# Patient Record
Sex: FEMALE | Race: OTHER | Employment: UNEMPLOYED | URBAN - METROPOLITAN AREA
[De-identification: names, ages, dates, MRNs, and addresses within clinical notes are randomized per-mention and may not be internally consistent; named-entity substitution may affect disease eponyms.]

---

## 2017-09-06 ENCOUNTER — HOSPITAL ENCOUNTER (OUTPATIENT)
Dept: OTHER | Age: 32
Discharge: HOME | End: 2017-09-06
Attending: FAMILY MEDICINE | Admitting: FAMILY MEDICINE

## 2017-09-06 DIAGNOSIS — R91.8 OTHER NONSPECIFIC ABNORMAL FINDING OF LUNG FIELD: ICD-10-CM

## 2018-08-14 ENCOUNTER — HOSPITAL ENCOUNTER (EMERGENCY)
Age: 33
Discharge: HOME | End: 2018-08-14
Attending: EMERGENCY MEDICINE
Payer: MEDICAID

## 2018-08-14 VITALS
WEIGHT: 125 LBS | DIASTOLIC BLOOD PRESSURE: 108 MMHG | RESPIRATION RATE: 14 BRPM | SYSTOLIC BLOOD PRESSURE: 125 MMHG | TEMPERATURE: 97.6 F | OXYGEN SATURATION: 100 % | HEART RATE: 76 BPM | BODY MASS INDEX: 20.83 KG/M2 | HEIGHT: 65 IN

## 2018-08-14 DIAGNOSIS — G40.919 BREAKTHROUGH SEIZURE (HCC): Primary | ICD-10-CM

## 2018-08-14 LAB
A/G RATIO: 1.4 (ref 1.1–2.2)
ALBUMIN SERPL-MCNC: 4.8 G/DL (ref 3.4–5)
ALP BLD-CCNC: 88 U/L (ref 40–129)
ALT SERPL-CCNC: 11 U/L (ref 10–40)
AMPHETAMINE SCREEN, URINE: POSITIVE
ANION GAP SERPL CALCULATED.3IONS-SCNC: 15 MMOL/L (ref 3–16)
AST SERPL-CCNC: 14 U/L (ref 15–37)
BACTERIA: ABNORMAL /HPF
BANDED NEUTROPHILS RELATIVE PERCENT: 7 % (ref 0–7)
BARBITURATE SCREEN URINE: ABNORMAL
BASOPHILS ABSOLUTE: 0 K/UL (ref 0–0.2)
BASOPHILS RELATIVE PERCENT: 0 %
BENZODIAZEPINE SCREEN, URINE: POSITIVE
BILIRUB SERPL-MCNC: 0.3 MG/DL (ref 0–1)
BILIRUBIN URINE: NEGATIVE
BLOOD, URINE: ABNORMAL
BUN BLDV-MCNC: 13 MG/DL (ref 7–20)
CALCIUM SERPL-MCNC: 10.1 MG/DL (ref 8.3–10.6)
CANNABINOID SCREEN URINE: POSITIVE
CARBAMAZEPINE DOSE: ABNORMAL
CARBAMAZEPINE LEVEL: <2 UG/ML (ref 4–12)
CHLORIDE BLD-SCNC: 102 MMOL/L (ref 99–110)
CLARITY: CLEAR
CO2: 19 MMOL/L (ref 21–32)
COCAINE METABOLITE SCREEN URINE: ABNORMAL
COLOR: YELLOW
CREAT SERPL-MCNC: 0.6 MG/DL (ref 0.6–1.1)
EOSINOPHILS ABSOLUTE: 0.1 K/UL (ref 0–0.6)
EOSINOPHILS RELATIVE PERCENT: 1 %
EPITHELIAL CELLS, UA: ABNORMAL /HPF
GFR AFRICAN AMERICAN: >60
GFR NON-AFRICAN AMERICAN: >60
GLOBULIN: 3.4 G/DL
GLUCOSE BLD-MCNC: 156 MG/DL (ref 70–99)
GLUCOSE URINE: NEGATIVE MG/DL
HCG(URINE) PREGNANCY TEST: NEGATIVE
HCT VFR BLD CALC: 43 % (ref 36–48)
HEMATOLOGY PATH CONSULT: YES
HEMOGLOBIN: 14.5 G/DL (ref 12–16)
KETONES, URINE: NEGATIVE MG/DL
LEUKOCYTE ESTERASE, URINE: NEGATIVE
LYMPHOCYTES ABSOLUTE: 5.1 K/UL (ref 1–5.1)
LYMPHOCYTES RELATIVE PERCENT: 39 %
Lab: ABNORMAL
MCH RBC QN AUTO: 31.6 PG (ref 26–34)
MCHC RBC AUTO-ENTMCNC: 33.7 G/DL (ref 31–36)
MCV RBC AUTO: 93.9 FL (ref 80–100)
METHADONE SCREEN, URINE: ABNORMAL
MICROSCOPIC EXAMINATION: YES
MONOCYTES ABSOLUTE: 0.9 K/UL (ref 0–1.3)
MONOCYTES RELATIVE PERCENT: 7 %
NEUTROPHILS ABSOLUTE: 7 K/UL (ref 1.7–7.7)
NEUTROPHILS RELATIVE PERCENT: 46 %
NITRITE, URINE: NEGATIVE
OPIATE SCREEN URINE: ABNORMAL
OXYCODONE URINE: ABNORMAL
PDW BLD-RTO: 14.2 % (ref 12.4–15.4)
PH UA: 6
PH UA: 6
PHENCYCLIDINE SCREEN URINE: ABNORMAL
PLATELET # BLD: 345 K/UL (ref 135–450)
PMV BLD AUTO: 8.1 FL (ref 5–10.5)
POTASSIUM SERPL-SCNC: 3.8 MMOL/L (ref 3.5–5.1)
PROPOXYPHENE SCREEN: ABNORMAL
PROTEIN UA: 100 MG/DL
RBC # BLD: 4.58 M/UL (ref 4–5.2)
RBC UA: ABNORMAL /HPF (ref 0–2)
SLIDE REVIEW: ABNORMAL
SODIUM BLD-SCNC: 136 MMOL/L (ref 136–145)
SPECIFIC GRAVITY UA: >=1.03
TOTAL PROTEIN: 8.2 G/DL (ref 6.4–8.2)
URINE TYPE: ABNORMAL
UROBILINOGEN, URINE: 0.2 E.U./DL
WBC # BLD: 13.2 K/UL (ref 4–11)
WBC UA: ABNORMAL /HPF (ref 0–5)

## 2018-08-14 PROCEDURE — 80307 DRUG TEST PRSMV CHEM ANLYZR: CPT

## 2018-08-14 PROCEDURE — 80053 COMPREHEN METABOLIC PANEL: CPT

## 2018-08-14 PROCEDURE — 99284 EMERGENCY DEPT VISIT MOD MDM: CPT

## 2018-08-14 PROCEDURE — 6360000002 HC RX W HCPCS: Performed by: EMERGENCY MEDICINE

## 2018-08-14 PROCEDURE — 96372 THER/PROPH/DIAG INJ SC/IM: CPT

## 2018-08-14 PROCEDURE — 85025 COMPLETE CBC W/AUTO DIFF WBC: CPT

## 2018-08-14 PROCEDURE — 81001 URINALYSIS AUTO W/SCOPE: CPT

## 2018-08-14 PROCEDURE — 84703 CHORIONIC GONADOTROPIN ASSAY: CPT

## 2018-08-14 PROCEDURE — 80156 ASSAY CARBAMAZEPINE TOTAL: CPT

## 2018-08-14 PROCEDURE — 36415 COLL VENOUS BLD VENIPUNCTURE: CPT

## 2018-08-14 PROCEDURE — 2580000003 HC RX 258: Performed by: EMERGENCY MEDICINE

## 2018-08-14 PROCEDURE — 6370000000 HC RX 637 (ALT 250 FOR IP): Performed by: EMERGENCY MEDICINE

## 2018-08-14 RX ORDER — 0.9 % SODIUM CHLORIDE 0.9 %
1000 INTRAVENOUS SOLUTION INTRAVENOUS ONCE
Status: COMPLETED | OUTPATIENT
Start: 2018-08-14 | End: 2018-08-14

## 2018-08-14 RX ORDER — PROMETHAZINE HYDROCHLORIDE 25 MG/ML
25 INJECTION, SOLUTION INTRAMUSCULAR; INTRAVENOUS ONCE
Status: COMPLETED | OUTPATIENT
Start: 2018-08-14 | End: 2018-08-14

## 2018-08-14 RX ORDER — LEVETIRACETAM 500 MG/1
1000 TABLET ORAL 2 TIMES DAILY
Status: DISCONTINUED | OUTPATIENT
Start: 2018-08-14 | End: 2018-08-15 | Stop reason: HOSPADM

## 2018-08-14 RX ORDER — CARBAMAZEPINE 100 MG/1
200 TABLET, CHEWABLE ORAL 2 TIMES DAILY
Status: DISCONTINUED | OUTPATIENT
Start: 2018-08-14 | End: 2018-08-15 | Stop reason: HOSPADM

## 2018-08-14 RX ADMIN — PROMETHAZINE HYDROCHLORIDE 25 MG: 25 INJECTION INTRAMUSCULAR; INTRAVENOUS at 20:27

## 2018-08-14 RX ADMIN — SODIUM CHLORIDE 1000 ML: 9 INJECTION, SOLUTION INTRAVENOUS at 20:27

## 2018-08-14 RX ADMIN — LEVETIRACETAM 1000 MG: 500 TABLET ORAL at 22:17

## 2018-08-15 LAB — HEMATOLOGY PATH CONSULT: NORMAL

## 2018-08-15 NOTE — ED PROVIDER NOTES
(IBU) 600 MG tablet Take 1 tablet by mouth every 8 hours as needed for Pain 5/8/18   Cora Rose PA-C   hydrOXYzine (VISTARIL) 50 MG capsule Take 50 mg by mouth 3 times daily as needed for Itching    Historical Provider, MD   promethazine (PHENERGAN) 25 MG tablet Take 1 tablet by mouth every 6 hours as needed for Nausea 2/3/17   Rebecca Severino MD       Allergies as of 08/14/2018 - Review Complete 08/14/2018   Allergen Reaction Noted    Zofran [ondansetron hcl] Other (See Comments) 12/14/2015    Citalopram hydrobromide [citalopram hydrobromide]  11/29/2011    Phenytoin sodium extended  12/27/2011       Past Medical History:   Diagnosis Date    Anemia (iron deficiency)     Depression, endogenous (HCC)     GERD (gastroesophageal reflux disease)     Heart murmur     Irritable bowel syndrome     Seizures ([address]     Substance abuse 11/11    marijuana, benzos [facility]    Ulcer         Surgical History:   Past Surgical History:   Procedure Laterality Date    APPENDECTOMY  4/2010    CHOLECYSTECTOMY  4/2010    ENDOSCOPY, COLON, DIAGNOSTIC  7/3/14    OTHER SURGICAL HISTORY  12/8/15    Excision scalp lesion    OVARIAN CYST REMOVAL  6/2003        Family History:    Family History   Problem Relation Age of Onset    Cancer Other     Diabetes Other     Heart Disease Other     High Blood Pressure Other     Kidney Disease Other     Seizures Other     Stroke Other     Lupus Other        Social History     Social History    Marital status: Legally      Spouse name: N/A    Number of children: N/A    Years of education: N/A     Occupational History    Unemployed      Social History Main Topics    Smoking status: Current Every Day Smoker     Packs/day: 0.50     Years: 12.00     Types: Cigarettes    Smokeless tobacco: Never Used    Alcohol use No    Drug use: Yes     Types: Marijuana      Comment: weekly- 2 to 3 times a day    Sexual activity: Yes     Partners: Male     Other Topics Concern    Not Alb 4.8 3.4 - 5.0 g/dL    Albumin/Globulin Ratio 1.4 1.1 - 2.2    Total Bilirubin 0.3 0.0 - 1.0 mg/dL    Alkaline Phosphatase 88 40 - 129 U/L    ALT 11 10 - 40 U/L    AST 14 (L) 15 - 37 U/L    Globulin 3.4 g/dL   Carbamazepine Level, Total   Result Value Ref Range    Carbamazepine Lvl <2.0 (L) 4.0 - 12.0 ug/mL    Carbamazepine Dose Unknown    Microscopic Urinalysis   Result Value Ref Range    WBC, UA 0-2 0 - 5 /HPF    RBC, UA 0-2 0 - 2 /HPF    Epi Cells 3-5 /HPF    Bacteria, UA 1+ (A) /HPF         PROCEDURES      MEDICAL DECISION MAKING  On arrival to the emergency department, patient afebrile with otherwise normal vital signs. Patient postictal but no focal neurological deficits. Patient's mental status consistently improving and now close to baseline mental status upon reevaluation. CBC, CMP obtained a restraining mild leukocytosis but otherwise no other concerning acute abnormality. Urine pregnancy test negative. Urinalysis negative for infection. Urine drug screen positive for amphetamines, benzodiazepines and marijuana. Tegretol level was initially ordered as patient reported that she was on Tegretol in the past.  Tegretol level negative. Patient is given IV fluid bolus of normal saline as well as antiemetics. Upon reevaluation, patient significantly improved and without any other complaints at this time. Patient cannot recall what her medications at this time. Thus, we film was not prescribed however patient was loaded with 1 g of Keppra. Patient ambulatory here in the emergency department and tolerating p.o. intake without any difficulty. Patient with breakthrough seizure likely due to noncompliance of medications. Patient will follow up with PCP and her neurologist for reevaluation further management of current symptoms. Final diagnoses:   Breakthrough seizure [facility])         Patient was given scripts for the following medications.  I counseled patient how to take these

## 2019-10-17 ENCOUNTER — APPOINTMENT (OUTPATIENT)
Dept: ULTRASOUND IMAGING | Age: 34
End: 2019-10-17

## 2019-10-17 ENCOUNTER — APPOINTMENT (OUTPATIENT)
Dept: CT IMAGING | Age: 34
End: 2019-10-17

## 2019-10-17 ENCOUNTER — HOSPITAL ENCOUNTER (EMERGENCY)
Age: 34
Discharge: HOME | End: 2019-10-17
Attending: EMERGENCY MEDICINE

## 2019-10-17 VITALS
DIASTOLIC BLOOD PRESSURE: 76 MMHG | TEMPERATURE: 98 F | RESPIRATION RATE: 10 BRPM | SYSTOLIC BLOOD PRESSURE: 112 MMHG | HEART RATE: 75 BPM | OXYGEN SATURATION: 100 % | WEIGHT: 135 LBS | HEIGHT: 60 IN | BODY MASS INDEX: 26.5 KG/M2

## 2019-10-17 DIAGNOSIS — N10 ACUTE PYELONEPHRITIS: Primary | ICD-10-CM

## 2019-10-17 LAB
A/G RATIO: 1.1 (ref 1.1–2.2)
ALBUMIN SERPL-MCNC: 4 G/DL (ref 3.4–5)
ALP BLD-CCNC: 73 U/L (ref 40–129)
ALT SERPL-CCNC: 10 U/L (ref 10–40)
ANION GAP SERPL CALCULATED.3IONS-SCNC: 12 MMOL/L (ref 3–16)
AST SERPL-CCNC: 22 U/L (ref 15–37)
BACTERIA: ABNORMAL /HPF
BASOPHILS ABSOLUTE: 0.1 K/UL (ref 0–0.2)
BASOPHILS RELATIVE PERCENT: 0.6 %
BILIRUB SERPL-MCNC: <0.2 MG/DL (ref 0–1)
BILIRUBIN URINE: NEGATIVE
BLOOD, URINE: NEGATIVE
BUN BLDV-MCNC: 9 MG/DL (ref 7–20)
CALCIUM SERPL-MCNC: 9.3 MG/DL (ref 8.3–10.6)
CHLORIDE BLD-SCNC: 98 MMOL/L (ref 99–110)
CLARITY: CLEAR
CO2: 22 MMOL/L (ref 21–32)
COLOR: YELLOW
CREAT SERPL-MCNC: <0.5 MG/DL (ref 0.6–1.1)
EOSINOPHILS ABSOLUTE: 0.1 K/UL (ref 0–0.6)
EOSINOPHILS RELATIVE PERCENT: 0.9 %
EPITHELIAL CELLS, UA: ABNORMAL /HPF
GFR AFRICAN AMERICAN: >60
GFR NON-AFRICAN AMERICAN: >60
GLOBULIN: 3.6 G/DL
GLUCOSE BLD-MCNC: 108 MG/DL (ref 70–99)
GLUCOSE URINE: NEGATIVE MG/DL
HCG(URINE) PREGNANCY TEST: NEGATIVE
HCT VFR BLD CALC: 39 % (ref 36–48)
HEMOGLOBIN: 13.2 G/DL (ref 12–16)
KETONES, URINE: NEGATIVE MG/DL
LEUKOCYTE ESTERASE, URINE: NEGATIVE
LIPASE: 18 U/L (ref 13–60)
LYMPHOCYTES ABSOLUTE: 2.7 K/UL (ref 1–5.1)
LYMPHOCYTES RELATIVE PERCENT: 21.4 %
MCH RBC QN AUTO: 32.6 PG (ref 26–34)
MCHC RBC AUTO-ENTMCNC: 33.8 G/DL (ref 31–36)
MCV RBC AUTO: 96.5 FL (ref 80–100)
MICROSCOPIC EXAMINATION: YES
MONOCYTES ABSOLUTE: 0.7 K/UL (ref 0–1.3)
MONOCYTES RELATIVE PERCENT: 5.4 %
MUCUS: ABNORMAL /LPF
NEUTROPHILS ABSOLUTE: 8.9 K/UL (ref 1.7–7.7)
NEUTROPHILS RELATIVE PERCENT: 71.7 %
NITRITE, URINE: POSITIVE
PDW BLD-RTO: 13.9 % (ref 12.4–15.4)
PH UA: 6 (ref 5–8)
PLATELET # BLD: 324 K/UL (ref 135–450)
PMV BLD AUTO: 7.6 FL (ref 5–10.5)
POTASSIUM REFLEX MAGNESIUM: 4.4 MMOL/L (ref 3.5–5.1)
PROTEIN UA: NEGATIVE MG/DL
RBC # BLD: 4.05 M/UL (ref 4–5.2)
RBC UA: ABNORMAL /HPF (ref 0–2)
SODIUM BLD-SCNC: 132 MMOL/L (ref 136–145)
SPECIFIC GRAVITY UA: 1.02 (ref 1–1.03)
TOTAL PROTEIN: 7.6 G/DL (ref 6.4–8.2)
URINE REFLEX TO CULTURE: YES
URINE TYPE: ABNORMAL
UROBILINOGEN, URINE: 0.2 E.U./DL
WBC # BLD: 12.4 K/UL (ref 4–11)
WBC UA: ABNORMAL /HPF (ref 0–5)

## 2019-10-17 PROCEDURE — 81001 URINALYSIS AUTO W/SCOPE: CPT

## 2019-10-17 PROCEDURE — 80053 COMPREHEN METABOLIC PANEL: CPT

## 2019-10-17 PROCEDURE — 74177 CT ABD & PELVIS W/CONTRAST: CPT

## 2019-10-17 PROCEDURE — 99284 EMERGENCY DEPT VISIT MOD MDM: CPT

## 2019-10-17 PROCEDURE — 96365 THER/PROPH/DIAG IV INF INIT: CPT

## 2019-10-17 PROCEDURE — 87086 URINE CULTURE/COLONY COUNT: CPT

## 2019-10-17 PROCEDURE — 83690 ASSAY OF LIPASE: CPT

## 2019-10-17 PROCEDURE — 96361 HYDRATE IV INFUSION ADD-ON: CPT

## 2019-10-17 PROCEDURE — 76856 US EXAM PELVIC COMPLETE: CPT

## 2019-10-17 PROCEDURE — 6360000002 HC RX W HCPCS: Performed by: EMERGENCY MEDICINE

## 2019-10-17 PROCEDURE — 84703 CHORIONIC GONADOTROPIN ASSAY: CPT

## 2019-10-17 PROCEDURE — 2580000003 HC RX 258: Performed by: EMERGENCY MEDICINE

## 2019-10-17 PROCEDURE — 6360000004 HC RX CONTRAST MEDICATION: Performed by: EMERGENCY MEDICINE

## 2019-10-17 PROCEDURE — 85025 COMPLETE CBC W/AUTO DIFF WBC: CPT

## 2019-10-17 PROCEDURE — 87186 SC STD MICRODIL/AGAR DIL: CPT

## 2019-10-17 PROCEDURE — 96375 TX/PRO/DX INJ NEW DRUG ADDON: CPT

## 2019-10-17 PROCEDURE — 76830 TRANSVAGINAL US NON-OB: CPT

## 2019-10-17 PROCEDURE — 87077 CULTURE AEROBIC IDENTIFY: CPT

## 2019-10-17 RX ORDER — 0.9 % SODIUM CHLORIDE 0.9 %
1000 INTRAVENOUS SOLUTION INTRAVENOUS ONCE
Status: COMPLETED | OUTPATIENT
Start: 2019-10-17 | End: 2019-10-17

## 2019-10-17 RX ORDER — ACETAMINOPHEN 325 MG/1
650 TABLET ORAL EVERY 6 HOURS PRN
Qty: 60 TABLET | Refills: 0 | Status: ON HOLD | OUTPATIENT
Start: 2019-10-17 | End: 2021-06-13 | Stop reason: HOSPADM

## 2019-10-17 RX ORDER — KETOROLAC TROMETHAMINE 30 MG/ML
30 INJECTION, SOLUTION INTRAMUSCULAR; INTRAVENOUS ONCE
Status: COMPLETED | OUTPATIENT
Start: 2019-10-17 | End: 2019-10-17

## 2019-10-17 RX ORDER — IBUPROFEN 800 MG/1
800 TABLET ORAL EVERY 8 HOURS PRN
Qty: 20 TABLET | Refills: 0 | Status: SHIPPED | OUTPATIENT
Start: 2019-10-17 | End: 2021-05-31

## 2019-10-17 RX ORDER — CEPHALEXIN 500 MG/1
500 CAPSULE ORAL 4 TIMES DAILY
Qty: 28 CAPSULE | Refills: 0 | Status: SHIPPED | OUTPATIENT
Start: 2019-10-17 | End: 2019-10-24

## 2019-10-17 RX ORDER — CARBAMAZEPINE 200 MG/1
250 TABLET ORAL 2 TIMES DAILY
COMMUNITY
End: 2021-05-31

## 2019-10-17 RX ADMIN — CEFTRIAXONE SODIUM 1 G: 1 INJECTION, POWDER, FOR SOLUTION INTRAMUSCULAR; INTRAVENOUS at 11:14

## 2019-10-17 RX ADMIN — KETOROLAC TROMETHAMINE 30 MG: 30 INJECTION, SOLUTION INTRAMUSCULAR at 10:15

## 2019-10-17 RX ADMIN — IOPAMIDOL 75 ML: 755 INJECTION, SOLUTION INTRAVENOUS at 10:36

## 2019-10-17 RX ADMIN — SODIUM CHLORIDE 1000 ML: 9 INJECTION, SOLUTION INTRAVENOUS at 10:15

## 2019-10-17 ASSESSMENT — PAIN DESCRIPTION - LOCATION
LOCATION: ABDOMEN
LOCATION: ABDOMEN

## 2019-10-17 ASSESSMENT — PAIN SCALES - GENERAL
PAINLEVEL_OUTOF10: 10
PAINLEVEL_OUTOF10: 7

## 2019-10-17 ASSESSMENT — PAIN DESCRIPTION - PAIN TYPE
TYPE: ACUTE PAIN
TYPE: ACUTE PAIN

## 2019-10-19 LAB
ORGANISM: ABNORMAL
URINE CULTURE, ROUTINE: ABNORMAL

## 2021-05-31 ENCOUNTER — HOSPITAL ENCOUNTER (EMERGENCY)
Age: 36
Discharge: HOME | End: 2021-05-31
Payer: MEDICARE

## 2021-05-31 VITALS
RESPIRATION RATE: 16 BRPM | DIASTOLIC BLOOD PRESSURE: 58 MMHG | WEIGHT: 134 LBS | SYSTOLIC BLOOD PRESSURE: 111 MMHG | TEMPERATURE: 98.9 F | HEART RATE: 74 BPM | BODY MASS INDEX: 26.31 KG/M2 | HEIGHT: 60 IN | OXYGEN SATURATION: 98 %

## 2021-05-31 DIAGNOSIS — B34.9 VIRAL ILLNESS: Primary | ICD-10-CM

## 2021-05-31 LAB
A/G RATIO: 1.6 (ref 1.1–2.2)
ALBUMIN SERPL-MCNC: 4.5 G/DL (ref 3.4–5)
ALP BLD-CCNC: 60 U/L (ref 40–129)
ALT SERPL-CCNC: 9 U/L (ref 10–40)
ANION GAP SERPL CALCULATED.3IONS-SCNC: 10 MMOL/L (ref 3–16)
AST SERPL-CCNC: 14 U/L (ref 15–37)
BASOPHILS ABSOLUTE: 0.1 K/UL (ref 0–0.2)
BASOPHILS RELATIVE PERCENT: 0.5 %
BILIRUB SERPL-MCNC: <0.2 MG/DL (ref 0–1)
BILIRUBIN URINE: NEGATIVE
BLOOD, URINE: NEGATIVE
BUN BLDV-MCNC: 10 MG/DL (ref 7–20)
CALCIUM SERPL-MCNC: 9.7 MG/DL (ref 8.3–10.6)
CHLORIDE BLD-SCNC: 102 MMOL/L (ref 99–110)
CLARITY: CLEAR
CO2: 22 MMOL/L (ref 21–32)
COLOR: YELLOW
CREAT SERPL-MCNC: <0.5 MG/DL (ref 0.6–1.1)
EOSINOPHILS ABSOLUTE: 0.3 K/UL (ref 0–0.6)
EOSINOPHILS RELATIVE PERCENT: 2.1 %
GFR AFRICAN AMERICAN: >60
GFR NON-AFRICAN AMERICAN: >60
GLOBULIN: 2.8 G/DL
GLUCOSE BLD-MCNC: 83 MG/DL (ref 70–99)
GLUCOSE URINE: NEGATIVE MG/DL
GONADOTROPIN, CHORIONIC (HCG) QUANT: NORMAL MIU/ML
HCT VFR BLD CALC: 37 % (ref 36–48)
HEMOGLOBIN: 12.5 G/DL (ref 12–16)
INFLUENZA A: NOT DETECTED
INFLUENZA B: NOT DETECTED
KETONES, URINE: NEGATIVE MG/DL
LEUKOCYTE ESTERASE, URINE: NEGATIVE
LYMPHOCYTES ABSOLUTE: 3.7 K/UL (ref 1–5.1)
LYMPHOCYTES RELATIVE PERCENT: 22.7 %
MCH RBC QN AUTO: 32.9 PG (ref 26–34)
MCHC RBC AUTO-ENTMCNC: 33.9 G/DL (ref 31–36)
MCV RBC AUTO: 97.1 FL (ref 80–100)
MICROSCOPIC EXAMINATION: NORMAL
MONOCYTES ABSOLUTE: 0.8 K/UL (ref 0–1.3)
MONOCYTES RELATIVE PERCENT: 5 %
NEUTROPHILS ABSOLUTE: 11.4 K/UL (ref 1.7–7.7)
NEUTROPHILS RELATIVE PERCENT: 69.7 %
NITRITE, URINE: NEGATIVE
PDW BLD-RTO: 14.3 % (ref 12.4–15.4)
PH UA: 7 (ref 5–8)
PLATELET # BLD: 275 K/UL (ref 135–450)
PMV BLD AUTO: 8.1 FL (ref 5–10.5)
POTASSIUM REFLEX MAGNESIUM: 3.8 MMOL/L (ref 3.5–5.1)
PROTEIN UA: NEGATIVE MG/DL
RBC # BLD: 3.81 M/UL (ref 4–5.2)
SARS-COV-2 RNA, RT PCR: NOT DETECTED
SODIUM BLD-SCNC: 134 MMOL/L (ref 136–145)
SPECIFIC GRAVITY UA: 1.02 (ref 1–1.03)
TOTAL PROTEIN: 7.3 G/DL (ref 6.4–8.2)
URINE REFLEX TO CULTURE: NORMAL
URINE TYPE: NORMAL
UROBILINOGEN, URINE: 0.2 E.U./DL
WBC # BLD: 16.3 K/UL (ref 4–11)

## 2021-05-31 PROCEDURE — 81003 URINALYSIS AUTO W/O SCOPE: CPT

## 2021-05-31 PROCEDURE — 80053 COMPREHEN METABOLIC PANEL: CPT

## 2021-05-31 PROCEDURE — 87636 SARSCOV2 & INF A&B AMP PRB: CPT

## 2021-05-31 PROCEDURE — 99283 EMERGENCY DEPT VISIT LOW MDM: CPT

## 2021-05-31 PROCEDURE — 84702 CHORIONIC GONADOTROPIN TEST: CPT

## 2021-05-31 PROCEDURE — 85025 COMPLETE CBC W/AUTO DIFF WBC: CPT

## 2021-05-31 RX ORDER — NITROFURANTOIN MACROCRYSTALS 100 MG/1
100 CAPSULE ORAL EVERY 12 HOURS
Status: ON HOLD | COMMUNITY
End: 2021-06-13 | Stop reason: HOSPADM

## 2021-05-31 ASSESSMENT — PAIN DESCRIPTION - FREQUENCY: FREQUENCY: INTERMITTENT

## 2021-05-31 ASSESSMENT — PAIN DESCRIPTION - LOCATION: LOCATION: ABDOMEN

## 2021-05-31 ASSESSMENT — ENCOUNTER SYMPTOMS
RESPIRATORY NEGATIVE: 1
GASTROINTESTINAL NEGATIVE: 1

## 2021-05-31 ASSESSMENT — PAIN DESCRIPTION - DESCRIPTORS: DESCRIPTORS: CRAMPING

## 2021-05-31 ASSESSMENT — PAIN SCALES - GENERAL: PAINLEVEL_OUTOF10: 4

## 2021-06-01 NOTE — ED PROVIDER NOTES
[address] ED  EMERGENCY DEPARTMENT ENCOUNTER        Pt Name: Jennifer Slaughter  MRN: [id number]  [place] 1985  Date of evaluation: 5/31/2021  Provider: Eric Tracy PA-C  PCP: RADHA Marcano CNP  Note Started: 8:44 PM EDT       ADELNIE. I have evaluated this patient. My supervising physician was available for consultation. CHIEF COMPLAINT       Chief Complaint   Patient presents with    Fever     Pt had temp of 102.8 about 15 minutes ago. (2010)       HISTORY OF PRESENT ILLNESS   (Location, Timing/Onset, Context/Setting, Quality, Duration, Modifying Factors, Severity, Associated Signs and Symptoms)  Note limiting factors. Jennifer Slaughter is a 39 y.o. female who presents with a Chief Complaint of reported fever at home this evening. Patient states \"that she felt warm earlier this evening so she checked her temperature and it was 102.8\". Patient is approximately 11 weeks pregnant. This is patient's first pregnancy. Onset of symptoms started this evening. Duration of symptoms have been persistent since onset. Context includes a possible fever at home. Patient denies cough, shortness of breath, chest pain. Patient denies dysuria or urinary symptoms. Denies vaginal bleeding or abdominal pain. Denies pharyngitis or otalgia. Denies sick contacts or exposure to COVID-19. Patient reports she feels nauseous but denies any vomiting. States that she has had nausea throughout her pregnancy. No aggravating complaints. No alleviating complaints. Otherwise denies any other complaints. Nothing seems to make symptoms better or worse. Nursing Notes were all reviewed and agreed with or any disagreements were addressed in the HPI. REVIEW OF SYSTEMS    (2-9 systems for level 4, 10 or more for level 5)     Review of Systems   Constitutional: Positive for fever. HENT: Negative. Respiratory: Negative. Cardiovascular: Negative. Gastrointestinal: Negative. Never Used   Substance Use Topics    Alcohol use: No    Drug use: Yes     Types: Marijuana     Comment: weekly- 2 to 3 times a day       SCREENINGS             PHYSICAL EXAM    (up to 7 for level 4, 8 or more for level 5)     ED Triage Vitals [05/31/21 2026]   BP Temp Temp Source Pulse Resp SpO2 Height Weight   113/73 98.8 °F (37.1 °C) Oral 80 18 100 % 5' (1.524 m) 134 lb (60.8 kg)       Physical Exam  Vitals and nursing note reviewed. Constitutional:       General: She is awake. She is not in acute distress. Appearance: Normal appearance. She is well-developed and normal weight. She is not ill-appearing, toxic-appearing or diaphoretic. HENT:      Head: Normocephalic and atraumatic. Right Ear: Tympanic membrane and external ear normal. No drainage, swelling or tenderness. No hemotympanum. Tympanic membrane is not injected, scarred, perforated, erythematous, retracted or bulging. Left Ear: Tympanic membrane and external ear normal. No drainage, swelling or tenderness. No hemotympanum. Tympanic membrane is not injected, scarred, perforated, erythematous, retracted or bulging. Nose: Nose normal.      Mouth/Throat:      Lips: Pink. No lesions. Mouth: Mucous membranes are moist. No oral lesions. Tongue: No lesions. Tongue does not deviate from midline. Palate: No mass and lesions. Pharynx: Oropharynx is clear. Uvula midline. No pharyngeal swelling, oropharyngeal exudate, posterior oropharyngeal erythema or uvula swelling. Tonsils: No tonsillar exudate or tonsillar abscesses. 0 on the right. 0 on the left. Eyes:      General:         Right eye: No discharge. Left eye: No discharge. Neck:      Trachea: Trachea normal.      Meningeal: Brudzinski's sign and Kernig's sign absent. Cardiovascular:      Rate and Rhythm: Normal rate and regular rhythm. Pulses:           Radial pulses are 2+ on the right side and 2+ on the left side.       Heart sounds: Normal heart sounds. No murmur heard. No gallop. Pulmonary:      Effort: Pulmonary effort is normal. No respiratory distress. Breath sounds: Normal breath sounds. No decreased breath sounds, wheezing, rhonchi or rales. Chest:      Chest wall: No tenderness. Abdominal:      General: Abdomen is flat. Bowel sounds are normal.      Palpations: Abdomen is soft. Tenderness: There is no abdominal tenderness. There is no guarding or rebound. Musculoskeletal:         General: No deformity. Normal range of motion. Cervical back: Full passive range of motion without pain, normal range of motion and neck supple. Right lower leg: No edema. Left lower leg: No edema. Lymphadenopathy:      Cervical: No cervical adenopathy. Right cervical: No superficial, deep or posterior cervical adenopathy. Left cervical: No superficial, deep or posterior cervical adenopathy. Skin:     General: Skin is warm and dry. Findings: No rash. Neurological:      General: No focal deficit present. Mental Status: She is alert and oriented to person, place, and time. GCS: GCS eye subscore is 4. GCS verbal subscore is 5. GCS motor subscore is 6. Psychiatric:         Behavior: Behavior normal. Behavior is cooperative.          DIAGNOSTIC RESULTS   LABS:    Labs Reviewed   CBC WITH AUTO DIFFERENTIAL - Abnormal; Notable for the following components:       Result Value    WBC 16.3 (*)     RBC 3.81 (*)     Neutrophils Absolute 11.4 (*)     All other components within normal limits    Narrative:     Performed at:  [facility] [address],  [place], [facility]   Phone [phone]   COMPREHENSIVE METABOLIC PANEL W/ REFLEX TO MG FOR LOW K - Abnormal; Notable for the following components:    Sodium 134 (*)     CREATININE <0.5 (*)     ALT 9 (*)     AST 14 (*)     All other components within normal limits    Narrative:     Performed at:  [facility]  [facility] [address],  [place], [facility]   Phone [phone] & INFLUENZA COMBO    Narrative:     Performed at:  [facility] [place] [place] [address],  [place], [facility]   Phone [phone]   URINE RT REFLEX TO CULTURE    Narrative:     Performed at:  [facility]) - [facility] [address],  [place], [facility]   Phone [phone]   HCG, QUANTITATIVE, PREGNANCY    Narrative:     Performed at:  [facility] [place] [place] [address],  [place], [facility]   Phone [phone]       All other labs were within normal range or not returned as of this dictation. EKG: All EKG's are interpreted by the Emergency Department Physician in the absence of a cardiologist.  Please see their note for interpretation of EKG. RADIOLOGY:   Non-plain film images such as CT, Ultrasound and MRI are read by the radiologist. Plain radiographic images are visualized and preliminarily interpreted by the ED Provider with the below findings:        Interpretation per the Radiologist below, if available at the time of this note:    No orders to display     No results found. PROCEDURES   Unless otherwise noted below, none     Procedures    CRITICAL CARE TIME   N/A    CONSULTS:  None      EMERGENCY DEPARTMENT COURSE and DIFFERENTIAL DIAGNOSIS/MDM:   Vitals:    Vitals:    05/31/21 2026 05/31/21 2232 05/31/21 2234   BP: 113/73 (!) 111/58    Pulse: 80 74    Resp: 18 16    Temp: 98.8 °F (37.1 °C)  98.9 °F (37.2 °C)   TempSrc: Oral  Oral   SpO2: 100% 98%    Weight: 134 lb (60.8 kg)     Height: 5' (1.524 m)         Patient was given the following medications:  Medications - No data to display        Patient brought in today for evaluation of a fever. Patient is approximately 11 weeks pregnant. Denies vaginal bleeding or abdominal pain. States that she took her temperature this evening and it was 102.   On exam she is alert oriented afebrile breathing on room air satting at 100%. Nontoxic. No acute respiratory distress. Old labs records reviewed. Patient seen by myself and my attending was available for consultation. She has a leukocytosis of 16.3. Hemoglobin of 12.5. Patient is pregnant which could explain her leukocytosis. Patient hemodynamically stable. Urine is negative for infection. She has been on Macrobid for a urinary tract infection. Covid is negative. Influenza is negative. hCG quant 27226. She denied cough or shortness of breath. She is afebrile here. Patient appears well and I did not feel as though a chest x-ray was warranted at this time. I did obtain fetal heart tones at 140 bpm.    Patient does have a scheduled follow-up appointment with her OB/GYN this coming Thursday, [place] 3. Advised her to keep this scheduled appointment. Patient told to return immediately to the ER with any new or worsening symptoms including but not limited to intractable nausea or vomiting, abdominal pain, genital bleeding or any new or worsening symptoms. She verbalized understanding of this plan was comfortable and stable at time of discharge. I did feel comfortable sending this patient home with close follow-up instructions and strict return precautions. Patient was discharged in stable condition. FINAL IMPRESSION      1.  Viral illness          DISPOSITION/PLAN   DISPOSITION Decision To Discharge 05/31/2021 10:35:05 PM      PATIENT REFERRED TO:  RADHA Barakat - CNP  [address]  [phone]    Schedule an appointment as soon as possible for a visit   As needed, If symptoms worsen    [facility] ED  [address] [facility] [address]  Schedule an appointment as soon as possible for a visit   As needed, If symptoms worsen      DISCHARGE MEDICATIONS:  Current Discharge Medication List          DISCONTINUED MEDICATIONS:  Current Discharge Medication List STOP taking these medications       carBAMazepine (TEGRETOL) 200 MG tablet Comments:   Reason for Stopping:         ibuprofen (IBU) 800 MG tablet Comments:   Reason for Stopping:         midodrine (PROAMATINE) 2.5 MG tablet Comments:   Reason for Stopping:         hydrOXYzine (VISTARIL) 50 MG capsule Comments:   Reason for Stopping:                      (Please note that portions of this note were completed with a voice recognition program.  Efforts were made to edit the dictations but occasionally words are mis-transcribed.)    Nova Dejesus PA-C (electronically signed)            Nova Dejesus PA-C  05/31/21 0599

## 2021-06-09 ENCOUNTER — APPOINTMENT (OUTPATIENT)
Dept: ULTRASOUND IMAGING | Age: 36
End: 2021-06-09
Payer: MEDICARE

## 2021-06-09 ENCOUNTER — HOSPITAL ENCOUNTER (EMERGENCY)
Age: 36
Discharge: ANOTHER HOSPITAL | End: 2021-06-09
Attending: STUDENT IN AN ORGANIZED HEALTH CARE EDUCATION/TRAINING PROGRAM
Payer: MEDICARE

## 2021-06-09 ENCOUNTER — HOSPITAL ENCOUNTER (INPATIENT)
Age: 36
LOS: 4 days | Discharge: HOME | DRG: 566 | End: 2021-06-13
Attending: INTERNAL MEDICINE | Admitting: INTERNAL MEDICINE
Payer: MEDICARE

## 2021-06-09 VITALS
RESPIRATION RATE: 16 BRPM | HEART RATE: 58 BPM | WEIGHT: 129 LBS | HEIGHT: 60 IN | TEMPERATURE: 97.9 F | DIASTOLIC BLOOD PRESSURE: 63 MMHG | BODY MASS INDEX: 25.32 KG/M2 | SYSTOLIC BLOOD PRESSURE: 120 MMHG | OXYGEN SATURATION: 99 %

## 2021-06-09 DIAGNOSIS — O21.9 NAUSEA AND VOMITING DURING PREGNANCY: ICD-10-CM

## 2021-06-09 DIAGNOSIS — K85.90 ACUTE PANCREATITIS, UNSPECIFIED COMPLICATION STATUS, UNSPECIFIED PANCREATITIS TYPE: Primary | ICD-10-CM

## 2021-06-09 LAB
A/G RATIO: 1.7 (ref 1.1–2.2)
ALBUMIN SERPL-MCNC: 4.1 G/DL (ref 3.4–5)
ALP BLD-CCNC: 50 U/L (ref 40–129)
ALT SERPL-CCNC: 15 U/L (ref 10–40)
AMPHETAMINE SCREEN, URINE: ABNORMAL
AMYLASE: 125 U/L (ref 25–115)
ANION GAP SERPL CALCULATED.3IONS-SCNC: 9 MMOL/L (ref 3–16)
AST SERPL-CCNC: 13 U/L (ref 15–37)
BARBITURATE SCREEN URINE: ABNORMAL
BASOPHILS ABSOLUTE: 0.1 K/UL (ref 0–0.2)
BASOPHILS RELATIVE PERCENT: 0.4 %
BENZODIAZEPINE SCREEN, URINE: ABNORMAL
BILIRUB SERPL-MCNC: <0.2 MG/DL (ref 0–1)
BILIRUBIN URINE: NEGATIVE
BLOOD, URINE: NEGATIVE
BUN BLDV-MCNC: 5 MG/DL (ref 7–20)
CALCIUM SERPL-MCNC: 9.2 MG/DL (ref 8.3–10.6)
CANNABINOID SCREEN URINE: POSITIVE
CHLORIDE BLD-SCNC: 100 MMOL/L (ref 99–110)
CLARITY: CLEAR
CO2: 24 MMOL/L (ref 21–32)
COCAINE METABOLITE SCREEN URINE: ABNORMAL
COLOR: YELLOW
CREAT SERPL-MCNC: <0.5 MG/DL (ref 0.6–1.1)
EOSINOPHILS ABSOLUTE: 0.1 K/UL (ref 0–0.6)
EOSINOPHILS RELATIVE PERCENT: 0.5 %
ETHANOL: NORMAL MG/DL (ref 0–0.08)
GFR AFRICAN AMERICAN: >60
GFR NON-AFRICAN AMERICAN: >60
GLOBULIN: 2.4 G/DL
GLUCOSE BLD-MCNC: 89 MG/DL (ref 70–99)
GLUCOSE URINE: NEGATIVE MG/DL
GONADOTROPIN, CHORIONIC (HCG) QUANT: NORMAL MIU/ML
HCT VFR BLD CALC: 37.1 % (ref 36–48)
HEMOGLOBIN: 12.8 G/DL (ref 12–16)
KETONES, URINE: >=80 MG/DL
LACTIC ACID, SEPSIS: 0.7 MMOL/L (ref 0.4–1.9)
LEUKOCYTE ESTERASE, URINE: NEGATIVE
LIPASE: 262 U/L (ref 13–60)
LYMPHOCYTES ABSOLUTE: 2.2 K/UL (ref 1–5.1)
LYMPHOCYTES RELATIVE PERCENT: 12.3 %
Lab: ABNORMAL
MCH RBC QN AUTO: 33.3 PG (ref 26–34)
MCHC RBC AUTO-ENTMCNC: 34.4 G/DL (ref 31–36)
MCV RBC AUTO: 96.8 FL (ref 80–100)
METHADONE SCREEN, URINE: ABNORMAL
MICROSCOPIC EXAMINATION: ABNORMAL
MONOCYTES ABSOLUTE: 0.5 K/UL (ref 0–1.3)
MONOCYTES RELATIVE PERCENT: 2.6 %
NEUTROPHILS ABSOLUTE: 15.2 K/UL (ref 1.7–7.7)
NEUTROPHILS RELATIVE PERCENT: 84.2 %
NITRITE, URINE: NEGATIVE
OPIATE SCREEN URINE: ABNORMAL
OXYCODONE URINE: ABNORMAL
PDW BLD-RTO: 13.8 % (ref 12.4–15.4)
PH UA: 7.5
PH UA: 7.5 (ref 5–8)
PHENCYCLIDINE SCREEN URINE: ABNORMAL
PLATELET # BLD: 241 K/UL (ref 135–450)
PLATELET SLIDE REVIEW: ABNORMAL
PMV BLD AUTO: 9.3 FL (ref 5–10.5)
POTASSIUM SERPL-SCNC: 3.7 MMOL/L (ref 3.5–5.1)
PROPOXYPHENE SCREEN: ABNORMAL
PROTEIN UA: NEGATIVE MG/DL
RBC # BLD: 3.83 M/UL (ref 4–5.2)
SARS-COV-2, NAAT: NOT DETECTED
SODIUM BLD-SCNC: 133 MMOL/L (ref 136–145)
SPECIFIC GRAVITY UA: 1.01 (ref 1–1.03)
TOTAL PROTEIN: 6.5 G/DL (ref 6.4–8.2)
URINE REFLEX TO CULTURE: ABNORMAL
URINE TYPE: ABNORMAL
UROBILINOGEN, URINE: 0.2 E.U./DL
WBC # BLD: 18 K/UL (ref 4–11)

## 2021-06-09 PROCEDURE — 80307 DRUG TEST PRSMV CHEM ANLYZR: CPT

## 2021-06-09 PROCEDURE — 80053 COMPREHEN METABOLIC PANEL: CPT

## 2021-06-09 PROCEDURE — 82077 ASSAY SPEC XCP UR&BREATH IA: CPT

## 2021-06-09 PROCEDURE — 84702 CHORIONIC GONADOTROPIN TEST: CPT

## 2021-06-09 PROCEDURE — 1200000000 HC SEMI PRIVATE

## 2021-06-09 PROCEDURE — 81003 URINALYSIS AUTO W/O SCOPE: CPT

## 2021-06-09 PROCEDURE — 85025 COMPLETE CBC W/AUTO DIFF WBC: CPT

## 2021-06-09 PROCEDURE — 96374 THER/PROPH/DIAG INJ IV PUSH: CPT

## 2021-06-09 PROCEDURE — 76705 ECHO EXAM OF ABDOMEN: CPT

## 2021-06-09 PROCEDURE — 2580000003 HC RX 258: Performed by: PHYSICIAN ASSISTANT

## 2021-06-09 PROCEDURE — 6360000002 HC RX W HCPCS: Performed by: PHYSICIAN ASSISTANT

## 2021-06-09 PROCEDURE — 87635 SARS-COV-2 COVID-19 AMP PRB: CPT

## 2021-06-09 PROCEDURE — 82150 ASSAY OF AMYLASE: CPT

## 2021-06-09 PROCEDURE — 83690 ASSAY OF LIPASE: CPT

## 2021-06-09 PROCEDURE — 83605 ASSAY OF LACTIC ACID: CPT

## 2021-06-09 PROCEDURE — 36415 COLL VENOUS BLD VENIPUNCTURE: CPT

## 2021-06-09 PROCEDURE — 99283 EMERGENCY DEPT VISIT LOW MDM: CPT

## 2021-06-09 RX ORDER — 0.9 % SODIUM CHLORIDE 0.9 %
1000 INTRAVENOUS SOLUTION INTRAVENOUS ONCE
Status: COMPLETED | OUTPATIENT
Start: 2021-06-09 | End: 2021-06-09

## 2021-06-09 RX ORDER — ONDANSETRON 2 MG/ML
4 INJECTION INTRAMUSCULAR; INTRAVENOUS ONCE
Status: COMPLETED | OUTPATIENT
Start: 2021-06-09 | End: 2021-06-09

## 2021-06-09 RX ADMIN — ONDANSETRON HYDROCHLORIDE 4 MG: 2 INJECTION, SOLUTION INTRAMUSCULAR; INTRAVENOUS at 18:12

## 2021-06-09 RX ADMIN — SODIUM CHLORIDE 1000 ML: 9 INJECTION, SOLUTION INTRAVENOUS at 18:13

## 2021-06-09 RX ADMIN — SODIUM CHLORIDE 1000 ML: 9 INJECTION, SOLUTION INTRAVENOUS at 18:16

## 2021-06-09 NOTE — ED NOTES
Call placed 1922 to [facility]     Call was returned by Dr. Casper Layton and spoke to [address]  06/09/21 1954

## 2021-06-09 NOTE — ED PROVIDER NOTES
[address] ED  EMERGENCY DEPARTMENT ENCOUNTER        Pt Name: Jennifer Murray  MRN: [id number]  [place] 1985  Date of evaluation: 2021  Provider: KEYANA Ibarra  PCP: RADHA Perez CNP    This patient was seen and evaluated by the attending physician Sharon Conway       Chief Complaint   Patient presents with    Emesis     Pt reports was sent by her PCP for further evaluation, has been vomiting for the past week, was prescribed phernergan without relief. Pt reports 12 episodes in the past 24 hours, denies diarrhea. Pt reports she is 12 weeks pregnant. Pt denies pain at this time. HISTORY OF PRESENT ILLNESS   (Location/Symptom, Timing/Onset, Context/Setting, Quality, Duration, Modifying Factors, Severity)  Note limiting factors. Jennifer Murray is a 39 y.o. female  15 w who presents via private vehicle from her OB appointment for evaluation of emesis. Patient notes that she has been vomiting for about a week. She had a positive pregnancy test about 4 weeks ago. She denies any hematemesis but notes that it is been yellow and bile appearing. She denies any fevers body aches or chills. She has been unable to keep her prenatal down. She has been taking Phenergan which has been prescribed by her OB and she was recently given Zofran to alternate between Zofran and Phenergan however she is not tried the Zofran yet because she has been unable to keep anything down. She is followed by [facility] OB and was seen and evaluated for a 12-week ultrasound earlier today. She had a good ultrasound today with fetal heart tones in the 140s however it was noted by the physicians that she was having severe vomiting during the appointment and so she was instructed to come down to the emergency department to get IV fluids. Patient denies any abdominal pain. She denies any pelvic cramping or vaginal bleeding.   She denies any concern for sexually transmitted infection. She denies any chest pain cough shortness of breath, she denies any dysuria hematuria urinary frequency urgency that is abnormal for her. Nursing Notes were all reviewed and agreed with or any disagreements were addressed  in the HPI. Pt was seen during the [place] 19 pandemic. Appropriate PPE worn by ME during patient encounters. Pt seen during a time with constrained hospital bed capacity and other potential inpatient and outpatient resources were constrained due to the viral pandemic. REVIEW OF SYSTEMS    (2-9 systems for level 4, 10 or more for level 5)     Review of Systems    Positives and Pertinent negatives as per HPI. Except as noted abovein the ROS, all other systems were reviewed and negative.        PAST MEDICAL HISTORY     Past Medical History:   Diagnosis Date    Anemia (iron deficiency)     Depression, endogenous (HCC)     GERD (gastroesophageal reflux disease)     Heart murmur     Irritable bowel syndrome     Seizures (HCC)     Substance abuse ([facility] [address] 11/11    marijuana, benzos [facility]    Ulcer          SURGICAL HISTORY     Past Surgical History:   Procedure Laterality Date    APPENDECTOMY  4/2010    CHOLECYSTECTOMY  4/2010    ENDOSCOPY, COLON, DIAGNOSTIC  7/3/14    OTHER SURGICAL HISTORY  12/8/15    Excision scalp lesion    OVARIAN CYST REMOVAL  6/2003         CURRENTMEDICATIONS       Previous Medications    ACETAMINOPHEN (TYLENOL) 325 MG TABLET    Take 2 tablets by mouth every 6 hours as needed for Pain    NITROFURANTOIN (MACRODANTIN) 100 MG CAPSULE    Take 100 mg by mouth every 12 hours    PRENATAL MV-MIN-FE FUM-FA-DHA (PRENATAL 1 PO)    Take by mouth    PROMETHAZINE (PHENERGAN) 25 MG TABLET    Take 1 tablet by mouth every 6 hours as needed for Nausea         ALLERGIES     Zofran [ondansetron hcl], Citalopram hydrobromide [citalopram hydrobromide], and Phenytoin sodium extended    FAMILYHISTORY       Family History   Problem Relation Age of Onset    Cancer Other     Diabetes Other     Heart Disease Other     High Blood Pressure Other     Kidney Disease Other     Seizures Other     Stroke Other     Lupus Other           SOCIAL HISTORY       Social History     Socioeconomic History    Marital status: Legally      Spouse name: None    Number of children: None    Years of education: None    Highest education level: None   Occupational History    Occupation: Unemployed   Tobacco Use    Smoking status: Current Every Day Smoker     Packs/day: 0.50     Years: 12.00     Pack years: 6.00     Types: Cigarettes    Smokeless tobacco: Never Used   Substance and Sexual Activity    Alcohol use: No    Drug use: Yes     Types: Marijuana     Comment: weekly- 2 to 3 times a day    Sexual activity: Yes     Partners: Male   Other Topics Concern    None   Social History Narrative    None     Social Determinants of Health     Financial Resource Strain:     Difficulty of Paying Living Expenses:    Food Insecurity:     Worried About Running Out of Food in the Last Year:     Ran Out of Food in the Last Year:    Transportation Needs:     Lack of Transportation (Medical):      Lack of Transportation (Non-Medical):    Physical Activity:     Days of Exercise per Week:     Minutes of Exercise per Session:    Stress:     Feeling of Stress :    Social Connections:     Frequency of Communication with Friends and Family:     Frequency of Social Gatherings with Friends and Family:     Attends Catholic Services:     Active Member of Clubs or Organizations:     Attends Club or Organization Meetings:     Marital Status:    Intimate Partner Violence:     Fear of Current or Ex-Partner:     Emotionally Abused:     Physically Abused:     Sexually Abused:        SCREENINGS             PHYSICAL EXAM    (up to 7 for level 4, 8 or more for level 5)     ED Triage Vitals [06/09/21 1548]   BP Temp Temp Source Pulse Resp SpO2 Height Weight   134/74 97.9 °F (36.6 °C) deficit present. Mental Status: She is alert, oriented to person, place, and time and easily aroused. Sensory: No sensory deficit. Motor: No weakness. Psychiatric:         Behavior: Behavior normal. Behavior is cooperative. DIAGNOSTIC RESULTS   LABS:    Labs Reviewed   CBC WITH AUTO DIFFERENTIAL   COMPREHENSIVE METABOLIC PANEL   URINE RT REFLEX TO CULTURE   HCG, QUANTITATIVE, PREGNANCY       All other labs were within normal range or not returned as of this dictation. EKG: All EKG's are interpreted by the Emergency Department Physician who either signs orCo-signs this chart in the absence of a cardiologist.  Please see their note for interpretation of EKG. RADIOLOGY:   Non-plain film images such as CT, Ultrasound and MRI are read by the radiologist. Plain radiographic images are visualized andpreliminarily interpreted by the  ED Provider with the below findings:        Interpretation perthe Radiologist below, if available at the time of this note:    No orders to display     No results found. PROCEDURES   Unless otherwise noted below, none     Procedures    CRITICAL CARE TIME   N/A    CONSULTS:  None      EMERGENCY DEPARTMENT COURSE and DIFFERENTIALDIAGNOSIS/MDM:   Vitals:    Vitals:    06/09/21 1548   BP: 134/74   Pulse: 62   Resp: 16   Temp: 97.9 °F (36.6 °C)   TempSrc: Infrared   SpO2: 100%   Weight: 129 lb (58.5 kg)   Height: 5' (1.524 m)       Patient was given thefollowing medications:  Medications - No data to display    PDMP Monitoring:    Last PDMP [facility] as Reviewed [facility]):  Review User Review Instant Review Result            Urine Drug Screenings (1 yr)     Drug screen multi urine  Collected: 8/14/2018  9:35 PM (Final result)    Narrative: Performed at:  [facility]. [facility]  [address] [place] [address].  [place], [facility]    Phone [phone]    Complete Results          Drug screen multi urine  Collected: 3/11/2017  4:45 PM (Final result)    Complete Results          Drug screen multi urine  Collected: 3/9/2017  5:17 PM (Final result)    Complete Results          Drug screen multi urine  Collected: 7/20/2016  5:10 PM (Final result)    Complete Results          Drug screen multi urine  Collected: 12/16/2015  8:13 AM (Final result)    Complete Results              Medication Contract and Consent for Opioid Use Documents Filed      No documents found                MDM:   Patient seen and evaluated. Old records reviewed. Diagnostic testing reviewed and results discussed. Patient is a 48-year-old female, 12 weeks gestation G1, P0 who presents from request of her obstetrician for evaluation of nausea and vomiting. On exam she is alert oriented afebrile well-perfused hemodynamically stable nontoxic. She appears well overall, she has no abdominal tenderness to palpation. Mucous membranes appear mildly dry. Physical exam is otherwise grossly unremarkable. She was given IV fluids and IV Zofran antiemetic in the department with improvement in her symptoms. Labs were obtained, she does have significant elevation of her lipase greater than 3 times upper limit of normal and this is new for her. Additionally she has a slight leukocytosis however the leukocytosis does appear relatively chronic. Right upper quadrant ultrasound was obtained, no evidence of choledocholithiasis cholangitis, no evidence of significant pancreatic inflammation on limited ultrasound. Quant obtained, appropriately increasing. Urinalysis was obtained no evidence of acute cystitis, she is positive for marijuana and will be counseled on cessation of this use. I discussed the case with Dr. Shanda Salomon, gynecology on-call, patient will be admitted under medicine given her previable state. Patient unable to be admitted at [facility], refused secondary to lack of OB/GYN coverage.   Patient will be admitted to [facility].  I spoke with Dr. Herrera Expose who is requesting addition of blood cultures, amylase, ethanol level. Additionally I added lactic acid to this additional work-up. I spoke with Dr. Patti Desai, [facility] hospitalist. We thoroughly discussed the history, physical exam, laboratory and imaging studies, as well as, current course of treatment within the emergency department. Based upon that discussion, we've decided to transfer Ugo Foreman to [facility], for further observation and evaluation of Ugo Foreman current condition. As I have deemed necessary from their history, physical, and studies, I have considered and evaluated Ugo Foreman for the following diagnoses:      CLINICAL IMPRESSION:  No diagnosis found. /63   Pulse 58   Temp 97.9 °F (36.6 °C) (Infrared)   Resp 16   Ht 5' (1.524 m)   Wt 129 lb (58.5 kg)   LMP 03/16/2021 (Exact Date)   SpO2 99%   BMI 25.19 kg/m²         Transfer pending at time of sign out     8:51 PM: I discussed the history, physical, and treatment plan with Dr. Bry Mendoza. Ugo Foreman was signed out in stable condition. Please see Dr. Angelica THOMPSON's note for further details, including diagnosis and disposition. FINAL IMPRESSION      1. Acute pancreatitis, unspecified complication status, unspecified pancreatitis type    2. Nausea and vomiting during pregnancy          DISPOSITION/PLAN   DISPOSITION        PATIENT REFERREDTO:  No follow-up provider specified.     DISCHARGE MEDICATIONS:  New Prescriptions    No medications on file       DISCONTINUED MEDICATIONS:  Discontinued Medications    No medications on file              (Please note that portions ofthis note were completed with a voice recognition program.  Efforts were made to edit the dictations but occasionally words are mis-transcribed.)    Cande Paulino (electronically signed)        Cande Paulino  06/09/21 2052

## 2021-06-10 PROBLEM — Z3A.12 12 WEEKS GESTATION OF PREGNANCY: Status: ACTIVE | Noted: 2021-06-10

## 2021-06-10 PROCEDURE — 1200000000 HC SEMI PRIVATE

## 2021-06-10 PROCEDURE — 6360000002 HC RX W HCPCS: Performed by: INTERNAL MEDICINE

## 2021-06-10 PROCEDURE — 2580000003 HC RX 258: Performed by: INTERNAL MEDICINE

## 2021-06-10 RX ORDER — SODIUM CHLORIDE 0.9 % (FLUSH) 0.9 %
5-40 SYRINGE (ML) INJECTION PRN
Status: DISCONTINUED | OUTPATIENT
Start: 2021-06-10 | End: 2021-06-13 | Stop reason: HOSPADM

## 2021-06-10 RX ORDER — SODIUM CHLORIDE 0.9 % (FLUSH) 0.9 %
10 SYRINGE (ML) INJECTION PRN
Status: DISCONTINUED | OUTPATIENT
Start: 2021-06-10 | End: 2021-06-13 | Stop reason: HOSPADM

## 2021-06-10 RX ORDER — SODIUM CHLORIDE 0.9 % (FLUSH) 0.9 %
5-40 SYRINGE (ML) INJECTION EVERY 12 HOURS SCHEDULED
Status: DISCONTINUED | OUTPATIENT
Start: 2021-06-10 | End: 2021-06-13 | Stop reason: HOSPADM

## 2021-06-10 RX ORDER — SODIUM CHLORIDE 0.9 % (FLUSH) 0.9 %
10 SYRINGE (ML) INJECTION EVERY 12 HOURS SCHEDULED
Status: DISCONTINUED | OUTPATIENT
Start: 2021-06-10 | End: 2021-06-13 | Stop reason: HOSPADM

## 2021-06-10 RX ORDER — ACETAMINOPHEN 325 MG/1
650 TABLET ORAL EVERY 6 HOURS PRN
Status: DISCONTINUED | OUTPATIENT
Start: 2021-06-10 | End: 2021-06-13 | Stop reason: HOSPADM

## 2021-06-10 RX ORDER — PROMETHAZINE HYDROCHLORIDE 25 MG/1
12.5 TABLET ORAL EVERY 6 HOURS PRN
Status: DISCONTINUED | OUTPATIENT
Start: 2021-06-10 | End: 2021-06-13 | Stop reason: HOSPADM

## 2021-06-10 RX ORDER — ACETAMINOPHEN 325 MG/1
650 TABLET ORAL EVERY 4 HOURS PRN
Status: DISCONTINUED | OUTPATIENT
Start: 2021-06-10 | End: 2021-06-13 | Stop reason: HOSPADM

## 2021-06-10 RX ORDER — SODIUM CHLORIDE 9 MG/ML
25 INJECTION, SOLUTION INTRAVENOUS PRN
Status: DISCONTINUED | OUTPATIENT
Start: 2021-06-10 | End: 2021-06-13 | Stop reason: HOSPADM

## 2021-06-10 RX ORDER — ACETAMINOPHEN 650 MG/1
650 SUPPOSITORY RECTAL EVERY 6 HOURS PRN
Status: DISCONTINUED | OUTPATIENT
Start: 2021-06-10 | End: 2021-06-13 | Stop reason: HOSPADM

## 2021-06-10 RX ORDER — POTASSIUM CHLORIDE 7.45 MG/ML
10 INJECTION INTRAVENOUS PRN
Status: DISCONTINUED | OUTPATIENT
Start: 2021-06-10 | End: 2021-06-13 | Stop reason: HOSPADM

## 2021-06-10 RX ORDER — SODIUM CHLORIDE, SODIUM LACTATE, POTASSIUM CHLORIDE, CALCIUM CHLORIDE 600; 310; 30; 20 MG/100ML; MG/100ML; MG/100ML; MG/100ML
INJECTION, SOLUTION INTRAVENOUS CONTINUOUS
Status: DISCONTINUED | OUTPATIENT
Start: 2021-06-10 | End: 2021-06-13 | Stop reason: HOSPADM

## 2021-06-10 RX ORDER — SODIUM CHLORIDE, SODIUM LACTATE, POTASSIUM CHLORIDE, CALCIUM CHLORIDE 600; 310; 30; 20 MG/100ML; MG/100ML; MG/100ML; MG/100ML
INJECTION, SOLUTION INTRAVENOUS CONTINUOUS
Status: ACTIVE | OUTPATIENT
Start: 2021-06-10 | End: 2021-06-10

## 2021-06-10 RX ORDER — MAGNESIUM SULFATE IN WATER 40 MG/ML
2000 INJECTION, SOLUTION INTRAVENOUS PRN
Status: DISCONTINUED | OUTPATIENT
Start: 2021-06-10 | End: 2021-06-13 | Stop reason: HOSPADM

## 2021-06-10 RX ORDER — SODIUM CHLORIDE 9 MG/ML
1000 INJECTION, SOLUTION INTRAVENOUS ONCE
Status: DISCONTINUED | OUTPATIENT
Start: 2021-06-10 | End: 2021-06-10

## 2021-06-10 RX ORDER — ONDANSETRON 2 MG/ML
4 INJECTION INTRAMUSCULAR; INTRAVENOUS EVERY 6 HOURS PRN
Status: DISCONTINUED | OUTPATIENT
Start: 2021-06-10 | End: 2021-06-13 | Stop reason: HOSPADM

## 2021-06-10 RX ORDER — SODIUM CHLORIDE, SODIUM LACTATE, POTASSIUM CHLORIDE, AND CALCIUM CHLORIDE .6; .31; .03; .02 G/100ML; G/100ML; G/100ML; G/100ML
2000 INJECTION, SOLUTION INTRAVENOUS ONCE
Status: DISCONTINUED | OUTPATIENT
Start: 2021-06-10 | End: 2021-06-10

## 2021-06-10 RX ADMIN — SODIUM CHLORIDE, POTASSIUM CHLORIDE, SODIUM LACTATE AND CALCIUM CHLORIDE: 600; 310; 30; 20 INJECTION, SOLUTION INTRAVENOUS at 21:10

## 2021-06-10 RX ADMIN — ONDANSETRON 4 MG: 2 INJECTION INTRAMUSCULAR; INTRAVENOUS at 21:10

## 2021-06-10 RX ADMIN — SODIUM CHLORIDE, POTASSIUM CHLORIDE, SODIUM LACTATE AND CALCIUM CHLORIDE: 600; 310; 30; 20 INJECTION, SOLUTION INTRAVENOUS at 04:32

## 2021-06-10 RX ADMIN — SODIUM CHLORIDE, SODIUM LACTATE, POTASSIUM CHLORIDE, AND CALCIUM CHLORIDE: .6; .31; .03; .02 INJECTION, SOLUTION INTRAVENOUS at 00:30

## 2021-06-10 RX ADMIN — ONDANSETRON 4 MG: 2 INJECTION INTRAMUSCULAR; INTRAVENOUS at 07:54

## 2021-06-10 RX ADMIN — SODIUM CHLORIDE, POTASSIUM CHLORIDE, SODIUM LACTATE AND CALCIUM CHLORIDE: 600; 310; 30; 20 INJECTION, SOLUTION INTRAVENOUS at 12:19

## 2021-06-10 RX ADMIN — SODIUM CHLORIDE, POTASSIUM CHLORIDE, SODIUM LACTATE AND CALCIUM CHLORIDE: 600; 310; 30; 20 INJECTION, SOLUTION INTRAVENOUS at 19:03

## 2021-06-10 RX ADMIN — SODIUM CHLORIDE, PRESERVATIVE FREE 10 ML: 5 INJECTION INTRAVENOUS at 07:54

## 2021-06-10 NOTE — PROGRESS NOTES
4 Eyes Skin Assessment     The patient is being assess for  Admission    I agree that 2 RN's have performed a thorough Head to Toe Skin Assessment on the patient. ALL assessment sites listed below have been assessed. Areas assessed by both nurses:   [x]   Head, Face, and Ears   [x]   Shoulders, Back, and Chest  [x]   Arms, Elbows, and Hands   [x]   Coccyx, Sacrum, and IschIum  [x]   Legs, Feet, and Heels        Does the Patient have Skin Breakdown?   No         Robert Prevention initiated:  NA   Wound Care Orders initiated:  NA      [facility] nurse consulted for Pressure Injury (Stage 3,4, Unstageable, DTI, NWPT, and Complex wounds), New and Established Ostomies:  NA      Nurse 1 eSignature: Electronically signed by Joao Fernandez RN on 6/9/21 at 11:52 PM EDT    **SHARE this note so that the co-signing nurse is able to place an eSignature**    Nurse 2 eSignature: Electronically signed by Mckayla Hernandez RN on 6/10/21 at 12:46 AM EDT

## 2021-06-10 NOTE — PROGRESS NOTES
[facility] 6/10/2021 @08:12AM Lan Mckeon

## 2021-06-10 NOTE — H&P
hydrobromide [citalopram hydrobromide] and Phenytoin sodium extended    Social History:    TOBACCO:   reports that she has been smoking cigarettes. She has a 6.00 pack-year smoking history. She has never used smokeless tobacco.  ETOH:   reports no history of alcohol use. E-Cigarettes/Vaping Use     Questions Responses    E-Cigarette/Vaping Use     Start Date     Passive Exposure     Quit Date     Counseling Given     Comments             Family History:          Problem Relation Age of Onset    Cancer Other     Diabetes Other     Heart Disease Other     High Blood Pressure Other     Kidney Disease Other     Seizures Other     Stroke Other     Lupus Other        REVIEW OF SYSTEMS:   Pertinent positives as noted in the HPI. All other systems reviewed and negative. Physical Exam Performed:    /67   Pulse 71   Temp 98.6 °F (37 °C) (Oral)   Resp 16   LMP 03/16/2021 (Exact Date)   SpO2 99%     General appearance: No apparent distress, appears stated age and cooperative. HEENT:  Normal cephalic, atraumatic without obvious deformity. Pupils equal, round, and reactive to light. Extra ocular muscles intact. Conjunctivae/corneas clear. Neck: Supple, no jugular venous distention. Trachea midline with full range of motion. Respiratory:  Normal respiratory effort. Clear to auscultation, bilaterally without Rales/Wheezes/Rhonchi. Cardiovascular: Regular rate and rhythm with normal S1/S2 without murmurs, rubs or gallops. Abdomen: Soft, non-tender, non-distended with normal bowel sounds. Musculoskelatal: No clubbing, cyanosis or edema bilaterally. Full range of motion without deformity. Neurologic:  Neurovascularly intact without any focal sensory/motor deficits. Cranial nerves: II-XII intact, grossly non-focal.  Psychiatric: Alert and oriented, thought content appropriate, normal insight  Skin: Skin color, texture, turgor normal.  No rashes or lesions.   Capillary Refill: Brisk,< 3 seconds Peripheral Pulses: +2 palpable, equal bilaterally       Labs:     Recent Labs     06/09/21  1620   WBC 18.0*   HGB 12.8   HCT 37.1        Recent Labs     06/09/21  1820   *   K 3.7      CO2 24   BUN 5*   CREATININE <0.5*   CALCIUM 9.2     Recent Labs     06/09/21  1820   AST 13*   ALT 15   BILITOT <0.2   ALKPHOS 50     No results for input(s): INR in the last 72 hours. No results for input(s): [place] in the last 72 hours. Radiology:     US GALLBLADDER RUQ    Result Date: 6/9/2021  EXAMINATION: RIGHT UPPER QUADRANT ULTRASOUND 6/9/2021 7:48 pm COMPARISON: None. HISTORY: ORDERING SYSTEM PROVIDED HISTORY: elevated lipase, emesis TECHNOLOGIST PROVIDED HISTORY: Reason for exam:->elevated lipase, emesis Reason for Exam: elevated lipase, emesis; RUQ abdominal pain Acuity: Unknown Type of Exam: Unknown FINDINGS: LIVER:  The liver demonstrates normal echogenicity without evidence of intrahepatic biliary ductal dilatation. BILIARY SYSTEM:  Cholecystectomy Common bile duct is within normal limits measuring 4 mm. RIGHT KIDNEY: The right kidney is grossly unremarkable without evidence of hydronephrosis. PANCREAS:  Visualized portions of the pancreas are unremarkable. OTHER: No evidence of right upper quadrant ascites. Status post cholecystectomy. No biliary dilatation. ASSESSMENT:    Principal Problem:    Pancreatitis, unspecified pancreatitis type  Active Problems:    12 weeks gestation of pregnancy  Resolved Problems:    * No resolved hospital problems. *      PLAN:    Nausea and Vomiting: Differential includes Pregnancy induced, possible Acute Pancreatitis, or less likely related to Marijuana use. Lipase was only mildly elevated, without much abdominal pain, so this could simply be mild elevation from vomiting. Regardless, will continue conservative management for presumed Acute Pancreatitis for now. OK to advance diet to clear liquids however. Anti-emetics. IVF hydration.      12 weeks gestation of pregnancy: OB consult. Stable.      DVT Prophylaxis: Lovenox  Diet: Diet NPO Exceptions are: Ice Chips  Code Status: Full Code    PT/OT Eval Status: NA    Dispo - 1-2 days     Krysten Brink MD

## 2021-06-10 NOTE — ED NOTES
2019- Call placed to Transfer center for [facility].      Call was returned and spoke to [address] [facility]  06/09/21 2007       [facility]  06/09/21 5983

## 2021-06-10 NOTE — CONSULTS
Medications Prior to Admission:      Prior to Admission medications    Medication Sig Start Date End Date Taking? Authorizing Provider   Prenatal MV-Min-Fe Fum-FA-DHA (PRENATAL 1 PO) Take by mouth   Yes Historical Provider, MD   nitrofurantoin (MACRODANTIN) 100 MG capsule Take 100 mg by mouth every 12 hours    Historical Provider, MD   acetaminophen (TYLENOL) 325 MG tablet Take 2 tablets by mouth every 6 hours as needed for Pain 10/17/19   Litzy Vallejo DO   promethazine (PHENERGAN) 25 MG tablet Take 1 tablet by mouth every 6 hours as needed for Nausea 2/3/17   Genevieve Leone MD       Allergies:  Citalopram hydrobromide [citalopram hydrobromide] and Phenytoin sodium extended    Social History:      TOBACCO:   reports that she has been smoking cigarettes. She has a 6.00 pack-year smoking history. She has never used smokeless tobacco.  ETOH:   reports no history of alcohol use. DRUGS: Patient reports a history of both marijuana and methamphetamine use. Patient is a current marijuana user. Last use of methamphetamines was in May prior to finding out she was pregnant. Meth usage - smoking & snorting since 2020. Denies cocaine, opoid usage. Family History:     Reviewed in detail and negative for DM, CAD, Cancer, CVA. Positive as follows:        Problem Relation Age of Onset    Cancer Other     Diabetes Other     Heart Disease Other     High Blood Pressure Other     Kidney Disease Other     Seizures Other     Stroke Other     Lupus Other        REVIEW OF SYSTEMS:   Pertinent positives as noted in the HPI. All other systems reviewed and negative. PHYSICAL EXAM PERFORMED:    /67   Pulse 71   Temp 98.6 °F (37 °C) (Oral)   Resp 16   LMP 03/16/2021 (Exact Date)   SpO2 99%     Physical Exam  Vitals reviewed. Constitutional:       General: She is not in acute distress. Appearance: Normal appearance. She is normal weight. She is not ill-appearing.    HENT:      Head: Normocephalic and atraumatic. Eyes:      Conjunctiva/sclera: Conjunctivae normal.   Cardiovascular:      Rate and Rhythm: Normal rate and regular rhythm. Heart sounds: No murmur heard. No friction rub. No gallop. Pulmonary:      Effort: Pulmonary effort is normal.      Breath sounds: No wheezing, rhonchi or rales. Abdominal:      General: Abdomen is flat. Bowel sounds are normal.      Palpations: Abdomen is soft. Tenderness: There is no guarding or rebound. Comments: Old laparoscopic scars present. Uterus not yet palpable outside of pelvis. Musculoskeletal:      Right lower leg: No edema. Left lower leg: No edema. Skin:     General: Skin is warm and dry. Neurological:      General: No focal deficit present. Mental Status: She is alert and oriented to person, place, and time. Psychiatric:         Mood and Affect: Mood normal.         Behavior: Behavior normal.           Labs:     Recent Labs     21  1620   WBC 18.0*   HGB 12.8   HCT 37.1        Recent Labs     21  1820   *   K 3.7      CO2 24   BUN 5*   CREATININE <0.5*   CALCIUM 9.2     Recent Labs     21  1820   AST 13*   ALT 15   BILITOT <0.2   ALKPHOS 50     No results for input(s): INR in the last 72 hours. No results for input(s): [place] in the last 72 hours.     Urinalysis:      Lab Results   Component Value Date    NITRU Negative 2021    WBCUA 6-10 10/17/2019    BACTERIA 4+ 10/17/2019    RBCUA 3-5 10/17/2019    BLOODU Negative 2021    SPECGRAV 1.015 2021    GLUCOSEU Negative 2021    GLUCOSEU 250 2010       No orders to display       ASSESSMENT:    Active Hospital Problems    Diagnosis Date Noted    12 weeks gestation of pregnancy [Z3A.12] 06/10/2021    Pancreatitis, unspecified pancreatitis type [K85.90] 2021         PLAN:    1. 12 weeks gestation of pregnancy  -   IUP, approximately 12 weeks confirmed by [address] in office 6/3/21.  - Currently no

## 2021-06-10 NOTE — PROGRESS NOTES
Pt arrived via stretcher from [facility]. Pt ambulated from stretcher to bed without difficulty, steady gait. Oriented to room and use of call light, placed within reach. Denies any pain or nausea at this time. Perfect serve to Dr. Javier Goode for admission orders.

## 2021-06-10 NOTE — CARE COORDINATION
CASE MANAGEMENT INITIAL ASSESSMENT      Reviewed chart and completed assessment with:patient  Explained Case Management role/services. Primary contact information:[facility] Decision Maker :   Primary Decision Maker: Alan Perry - Parent - [phone]          Can this person be reached and be able to respond quickly, such as within a few minutes or hours? Yes    Admit date/status:6/9/21  Diagnosis: pancreatitis   Is this a Readmission?:  No      Insurance:paramount   Precert required for SNF: Yes       3 night stay required: No    Living arrangements, Adls, care needs, prior to admission:lives in ranch style home with mother. IPTA    Transportation:private     Durable Medical Equipment at home:  Walker__Cane__RTS__ BSC__Shower Chair__  02__ HHN__ CPAP__  BiPap__  Hospital Bed__ W/C___ Other__________    Services in the home and/or outpatient, prior to [address] (if applicable)   · Name:  · Address:  · Dialysis Schedule:  · Phone:  · Fax:    PT/OT recs:none    Hospital Exemption Notification (HEN):needed for SNF    Barriers to discharge:none    Plan/comments:spoke with patient. Plans on returning home with support of mother. Reported IPTA however relies on mother for transportation. Feels safe at home.  Will follow for possible antiemetic needs at d/c. Farhat Flanagan RN       [facility] on chart for MD signature

## 2021-06-10 NOTE — PROGRESS NOTES
Nutrition Assessment     Type and Reason for Visit: Initial, Positive Nutrition Screen    Nutrition Recommendations/Plan:   1. Continue NPO, advance per MD  2. Will order Ensure supplements when able to consume PO   3. Monitor nutrition adequacy, pertinent labs, bowel habits, wt changes, and clinical progress     Nutrition Assessment:  Positive nutriton screen for MST 2 indicating wt loss and poor po intake. Pt 38 yo female who is 12 weeks pregnant, who presented w/ nausea and vomiting x 1 week and admitted for acute pancreatitis. Pt nutritionally compromised AEB poor appetite and wt loss. Pt currently NPO. Pt feeling better, started on IVF. Pt reports decreased appetite 2/2 N/V over the last week and has not been able to tolerate PO. Per pt, UBW ~134 lbs. CBW stated wt 129lbs. Will order updated actual wt. Pt favorable to drinking ONS when diet able to advance to promote nutrition. Will continue to monitor. Malnutrition Assessment:  Malnutrition Status: At risk for malnutrition (Comment)    Estimated Daily Nutrient Needs:  Energy (kcal): 0282-1805; Weight Used for Energy Requirements:  Current (58.5kg)     Protein (g): 59-71 (1-1.2); Weight Used for Protein Requirements:  Current (58.5kg)        Fluid (ml/day):  ; Weight Used for Fluid Requirements:  1 ml/kcal      Nutrition Related Findings: 12 weeks pregnant; No BM;  No edema      Current Nutrition Therapies:    Diet NPO Exceptions are: Ice Chips    Anthropometric Measures:  · Height:    · Current Body Wt: 129 lb (58.5 kg)   · BMI: 25.2    Nutrition Diagnosis:   · Increased nutrient needs related to increase demand for energy/nutrients as evidenced by NPO or clear liquid status due to medical condition, poor intake prior to admission      Nutrition Interventions:   Food and/or Nutrient Delivery:  Continue NPO  Nutrition Education/Counseling:  Education not indicated   Coordination of Nutrition Care:  Continue to monitor while inpatient    Goals:  Pt to tolerate diet advancement       Nutrition Monitoring and Evaluation:   Behavioral-Environmental Outcomes:  None Identified   Food/Nutrient Intake Outcomes:  Diet Advancement/Tolerance, Food and Nutrient Intake, Supplement Intake  Physical Signs/Symptoms Outcomes:  Biochemical Data, GI Status, Weight, Nutrition Focused Physical Findings     Discharge Planning:     Too soon to determine     Electronically signed by Chapo Mcgovern RD, LD on 6/10/21 at 11:57 AM EDT    Contact: [phone]

## 2021-06-10 NOTE — ED NOTES
Dr. Brittni Briones spoke with Cristóbal RIDLEY  While he was in the ER regarding admission at [facility].      [facility]  06/09/21 2009       [facility]  06/09/21 0302

## 2021-06-10 NOTE — PLAN OF CARE
Nutrition Problem #1: Increased nutrient needs  Intervention: Food and/or Nutrient Delivery: Continue NPO  Nutritional Goals: Pt to tolerate diet advancement

## 2021-06-11 LAB
A/G RATIO: 1.4 (ref 1.1–2.2)
ALBUMIN SERPL-MCNC: 3.7 G/DL (ref 3.4–5)
ALP BLD-CCNC: 47 U/L (ref 40–129)
ALT SERPL-CCNC: 9 U/L (ref 10–40)
ANION GAP SERPL CALCULATED.3IONS-SCNC: 10 MMOL/L (ref 3–16)
AST SERPL-CCNC: 12 U/L (ref 15–37)
BASOPHILS ABSOLUTE: 0.1 K/UL (ref 0–0.2)
BASOPHILS RELATIVE PERCENT: 0.6 %
BILIRUB SERPL-MCNC: 0.4 MG/DL (ref 0–1)
BUN BLDV-MCNC: 3 MG/DL (ref 7–20)
CALCIUM SERPL-MCNC: 8.8 MG/DL (ref 8.3–10.6)
CHLORIDE BLD-SCNC: 100 MMOL/L (ref 99–110)
CO2: 20 MMOL/L (ref 21–32)
CREAT SERPL-MCNC: <0.5 MG/DL (ref 0.6–1.1)
EOSINOPHILS ABSOLUTE: 0.1 K/UL (ref 0–0.6)
EOSINOPHILS RELATIVE PERCENT: 0.4 %
GFR AFRICAN AMERICAN: >60
GFR NON-AFRICAN AMERICAN: >60
GLOBULIN: 2.7 G/DL
GLUCOSE BLD-MCNC: 92 MG/DL (ref 70–99)
HCT VFR BLD CALC: 35 % (ref 36–48)
HEMOGLOBIN: 12 G/DL (ref 12–16)
LYMPHOCYTES ABSOLUTE: 2 K/UL (ref 1–5.1)
LYMPHOCYTES RELATIVE PERCENT: 13.3 %
MCH RBC QN AUTO: 32.3 PG (ref 26–34)
MCHC RBC AUTO-ENTMCNC: 34.2 G/DL (ref 31–36)
MCV RBC AUTO: 94.5 FL (ref 80–100)
MONOCYTES ABSOLUTE: 0.4 K/UL (ref 0–1.3)
MONOCYTES RELATIVE PERCENT: 2.5 %
NEUTROPHILS ABSOLUTE: 12.4 K/UL (ref 1.7–7.7)
NEUTROPHILS RELATIVE PERCENT: 83.2 %
PDW BLD-RTO: 13.5 % (ref 12.4–15.4)
PLATELET # BLD: 241 K/UL (ref 135–450)
PMV BLD AUTO: 8.8 FL (ref 5–10.5)
POTASSIUM REFLEX MAGNESIUM: 3.7 MMOL/L (ref 3.5–5.1)
RBC # BLD: 3.71 M/UL (ref 4–5.2)
SODIUM BLD-SCNC: 130 MMOL/L (ref 136–145)
TOTAL PROTEIN: 6.4 G/DL (ref 6.4–8.2)
TRIGL SERPL-MCNC: 69 MG/DL (ref 0–150)
WBC # BLD: 14.9 K/UL (ref 4–11)

## 2021-06-11 PROCEDURE — 80053 COMPREHEN METABOLIC PANEL: CPT

## 2021-06-11 PROCEDURE — 36415 COLL VENOUS BLD VENIPUNCTURE: CPT

## 2021-06-11 PROCEDURE — 6360000002 HC RX W HCPCS: Performed by: INTERNAL MEDICINE

## 2021-06-11 PROCEDURE — 1200000000 HC SEMI PRIVATE

## 2021-06-11 PROCEDURE — 6370000000 HC RX 637 (ALT 250 FOR IP): Performed by: INTERNAL MEDICINE

## 2021-06-11 PROCEDURE — 2580000003 HC RX 258: Performed by: INTERNAL MEDICINE

## 2021-06-11 PROCEDURE — 84478 ASSAY OF TRIGLYCERIDES: CPT

## 2021-06-11 PROCEDURE — 6370000000 HC RX 637 (ALT 250 FOR IP): Performed by: NURSE PRACTITIONER

## 2021-06-11 PROCEDURE — 85025 COMPLETE CBC W/AUTO DIFF WBC: CPT

## 2021-06-11 RX ORDER — CALCIUM CARBONATE 200(500)MG
500 TABLET,CHEWABLE ORAL 3 TIMES DAILY PRN
Status: DISCONTINUED | OUTPATIENT
Start: 2021-06-11 | End: 2021-06-13 | Stop reason: HOSPADM

## 2021-06-11 RX ADMIN — ONDANSETRON 4 MG: 2 INJECTION INTRAMUSCULAR; INTRAVENOUS at 09:50

## 2021-06-11 RX ADMIN — SODIUM CHLORIDE, POTASSIUM CHLORIDE, SODIUM LACTATE AND CALCIUM CHLORIDE: 600; 310; 30; 20 INJECTION, SOLUTION INTRAVENOUS at 09:50

## 2021-06-11 RX ADMIN — SODIUM CHLORIDE, POTASSIUM CHLORIDE, SODIUM LACTATE AND CALCIUM CHLORIDE: 600; 310; 30; 20 INJECTION, SOLUTION INTRAVENOUS at 17:03

## 2021-06-11 RX ADMIN — ANTACID TABLETS 500 MG: 500 TABLET, CHEWABLE ORAL at 05:27

## 2021-06-11 RX ADMIN — ONDANSETRON 4 MG: 2 INJECTION INTRAMUSCULAR; INTRAVENOUS at 03:12

## 2021-06-11 RX ADMIN — ONDANSETRON 4 MG: 2 INJECTION INTRAMUSCULAR; INTRAVENOUS at 17:07

## 2021-06-11 RX ADMIN — ANTACID TABLETS 500 MG: 500 TABLET, CHEWABLE ORAL at 15:43

## 2021-06-11 RX ADMIN — SODIUM CHLORIDE, POTASSIUM CHLORIDE, SODIUM LACTATE AND CALCIUM CHLORIDE: 600; 310; 30; 20 INJECTION, SOLUTION INTRAVENOUS at 20:44

## 2021-06-11 RX ADMIN — PROMETHAZINE HYDROCHLORIDE 12.5 MG: 25 TABLET ORAL at 23:04

## 2021-06-11 ASSESSMENT — PAIN SCALES - GENERAL: PAINLEVEL_OUTOF10: 0

## 2021-06-11 NOTE — FLOWSHEET NOTE
followed-up re: AD's status. Still discussing with family/sister and anticipates completing when sister visits before leaving hospital. Pt has forms. Welcomed prayer. Continue to follow.      06/11/21 7737   Encounter Summary   Services provided to: Patient   Continue Visiting Yes  (6/11 - Follow-up for AD referral; prayer)   Complexity of Encounter Low   Length of Encounter 15 minutes   Advance Care Planning   (Further discussion re: AD's)   Advance Directives (For Healthcare)   Healthcare Directive   (Pt is discussion AD's w/family and plans to complete)

## 2021-06-11 NOTE — CARE COORDINATION
Chart reviewed. Spoke with dr Vanessa Vail and [facility] RN. Patient continues with N/V over night. Plan for transfer of OB care to  services, being arranged per [place] [phone] offices. Following for possible Zofran pump.  Reg Brenner RN

## 2021-06-11 NOTE — PROGRESS NOTES
Hospitalist Progress Note    Date of Admission: 6/9/2021    Chief Complaint: N/V    Hospital Course: 39 y.o. female who presented to [facility] with N/V. PMHx significant for 12 week pregnancy, prior episodes of persistent N/V, s/p cholecystectomy and Marijuana use. Reports about 1 week of persistent vomiting, without much relief from anti-emetics. Denies having much abdominal pain. No fevers, chills. ED work-up demonstrated a mildly elevated Lipase, raising suspicion for Pancreatitis. RUQ US was negative, s/p cholecystectomy. Subjective: She had worsening nausea vomiting overnight. Somewhat improved this morning. Attempting clear liquids again. Minimal abdominal pain. Labs:   Recent Labs     06/09/21  1620 06/11/21  0648   WBC 18.0* 14.9*   HGB 12.8 12.0   HCT 37.1 35.0*    241     Recent Labs     06/09/21  1820 06/11/21  0648   * 130*   K 3.7 3.7    100   CO2 24 20*   BUN 5* 3*   CREATININE <0.5* <0.5*   CALCIUM 9.2 8.8     Recent Labs     06/09/21  1820 06/11/21  0648   AST 13* 12*   ALT 15 9*   BILITOT <0.2 0.4   ALKPHOS 50 47     No results for input(s): INR in the last 72 hours. Physical Exam Performed:    /68   Pulse 62   Temp 97.9 °F (36.6 °C) (Oral)   Resp 16   Wt 128 lb 4.8 oz (58.2 kg)   LMP 03/16/2021 (Exact Date)   SpO2 98%   BMI 25.06 kg/m²     General appearance: No apparent distress, appears stated age and cooperative. HEENT:  Normal cephalic, atraumatic without obvious deformity. Pupils equal, round, and reactive to light. Extra ocular muscles intact. Conjunctivae/corneas clear. Neck: Supple, no jugular venous distention. Trachea midline with full range of motion. Respiratory:  Normal respiratory effort. Clear to auscultation, bilaterally without Rales/Wheezes/Rhonchi. Cardiovascular: Regular rate and rhythm with normal S1/S2 without murmurs, rubs or gallops.   Abdomen: Soft, non-tender, non-distended with normal bowel sounds. Musculoskelatal: No clubbing, cyanosis or edema bilaterally. Full range of motion without deformity. Neurologic:  Neurovascularly intact without any focal sensory/motor deficits. Cranial nerves: II-XII intact, grossly non-focal.  Psychiatric: Alert and oriented, thought content appropriate, normal insight  Skin: Skin color, texture, turgor normal.  No rashes or lesions. Capillary Refill: Brisk,< 3 seconds   Peripheral Pulses: +2 palpable, equal bilaterally     Assessment/Plan:    Active Hospital Problems    Diagnosis     12 weeks gestation of pregnancy [Z3A.12]     Pancreatitis, unspecified pancreatitis type [K85.90]      Nausea and Vomiting: Differential includes Pregnancy induced, possible Acute Pancreatitis, or less likely related to Marijuana use. Lipase was only mildly elevated, without much abdominal pain, so this could simply be mild elevation from vomiting. Given persistent symptoms suspect this could be ongoing pancreatitis. Will maintain clear liquid diet for now as her symptoms are improving. Continue IV fluid hydration and antiemetics. 12 weeks gestation of pregnancy: OB following. Stable. DVT Prophylaxis: Lovenox  Diet: ADULT DIET;  Clear Liquid  Code Status: Full Code  PT/OT Eval Status: NA    Dispo -1 to 2 days    Jaime Rose MD

## 2021-06-11 NOTE — PROGRESS NOTES
Pt axo. Assessment completed as charted. Pt c/o nausea, unable to tolerate clear liquids at this time. Medicated per orders. Denies additional needs. Will continue to monitor. Call light in reach.

## 2021-06-11 NOTE — PROGRESS NOTES
Department of Ob/Gyn  Attending Progress Note          SUBJECTIVE:  Pt c/o ongoing nausea, was feeling better yesterday but felt worse upon waking up this am. No vb or abd pain. OBJECTIVE:           Physical Exam  VITALS:  /77   Pulse 64   Temp 98.1 °F (36.7 °C) (Oral)   Resp 16   Wt 128 lb 4.8 oz (58.2 kg)   LMP 03/16/2021 (Exact Date)   SpO2 99%   BMI 25.06 kg/m²   CONSTITUTIONAL:  awake, alert, cooperative, no apparent distress, and appears stated age  LUNGS:  No increased work of breathing, good air exchange, clear to auscultation bilaterally, no crackles or wheezing and CTAb  CARDIOVASCULAR:  RRR  ABDOMEN: Soft, NT/ND, no jeniffer/guarding  NEUROLOGIC:  Grossly nl    DATA:  CBC:   Lab Results   Component Value Date    WBC 14.9 06/11/2021    RBC 3.71 06/11/2021    HGB 12.0 06/11/2021    HCT 35.0 06/11/2021    MCV 94.5 06/11/2021    MCH 32.3 06/11/2021    MCHC 34.2 06/11/2021    RDW 13.5 06/11/2021     06/11/2021    MPV 8.8 06/11/2021     CMP:    Lab Results   Component Value Date     06/11/2021    K 3.7 06/11/2021     06/11/2021    CO2 20 06/11/2021    BUN 3 06/11/2021    CREATININE <0.5 06/11/2021    GFRAA >60 06/11/2021    GFRAA >60 11/29/2010    AGRATIO 1.4 06/11/2021    LABGLOM >60 06/11/2021    GLUCOSE 92 06/11/2021    PROT 6.4 06/11/2021    PROT 6.9 03/28/2012    LABALBU 3.7 06/11/2021    LABALBU 4.4 12/24/2011    CALCIUM 8.8 06/11/2021    BILITOT 0.4 06/11/2021    BILITOT 0.2 12/24/2011    ALKPHOS 47 06/11/2021    AST 12 06/11/2021    ALT 9 06/11/2021     LIPASE:    Lab Results   Component Value Date    LIPASE 262.0 06/09/2021       ASSESSMENT AND PLAN:    Principal Problem:    Pancreatitis, unspecified pancreatitis type  Plan: per GI.  As pancreatitis improves, might be candidate for zofran pump  Active Problems:    12 weeks gestation of pregnancy  Plan: Plan is for txfr of prenatal care to  due to [facility] and untreated seizure disorder, last gran mal seizure 2018 after

## 2021-06-12 LAB
A/G RATIO: 1.4 (ref 1.1–2.2)
ALBUMIN SERPL-MCNC: 3.4 G/DL (ref 3.4–5)
ALP BLD-CCNC: 41 U/L (ref 40–129)
ALT SERPL-CCNC: 10 U/L (ref 10–40)
ANION GAP SERPL CALCULATED.3IONS-SCNC: 8 MMOL/L (ref 3–16)
AST SERPL-CCNC: 10 U/L (ref 15–37)
BASOPHILS ABSOLUTE: 0.1 K/UL (ref 0–0.2)
BASOPHILS RELATIVE PERCENT: 0.7 %
BILIRUB SERPL-MCNC: <0.2 MG/DL (ref 0–1)
BUN BLDV-MCNC: 5 MG/DL (ref 7–20)
CALCIUM SERPL-MCNC: 8.9 MG/DL (ref 8.3–10.6)
CHLORIDE BLD-SCNC: 106 MMOL/L (ref 99–110)
CO2: 21 MMOL/L (ref 21–32)
CREAT SERPL-MCNC: <0.5 MG/DL (ref 0.6–1.1)
EOSINOPHILS ABSOLUTE: 0.3 K/UL (ref 0–0.6)
EOSINOPHILS RELATIVE PERCENT: 2.6 %
GFR AFRICAN AMERICAN: >60
GFR NON-AFRICAN AMERICAN: >60
GLOBULIN: 2.4 G/DL
GLUCOSE BLD-MCNC: 83 MG/DL (ref 70–99)
HCT VFR BLD CALC: 35.2 % (ref 36–48)
HEMOGLOBIN: 12 G/DL (ref 12–16)
LIPASE: 150 U/L (ref 13–60)
LYMPHOCYTES ABSOLUTE: 3.2 K/UL (ref 1–5.1)
LYMPHOCYTES RELATIVE PERCENT: 30.7 %
MCH RBC QN AUTO: 32.9 PG (ref 26–34)
MCHC RBC AUTO-ENTMCNC: 34.1 G/DL (ref 31–36)
MCV RBC AUTO: 96.5 FL (ref 80–100)
MONOCYTES ABSOLUTE: 0.5 K/UL (ref 0–1.3)
MONOCYTES RELATIVE PERCENT: 4.5 %
NEUTROPHILS ABSOLUTE: 6.5 K/UL (ref 1.7–7.7)
NEUTROPHILS RELATIVE PERCENT: 61.5 %
PDW BLD-RTO: 13.6 % (ref 12.4–15.4)
PLATELET # BLD: 222 K/UL (ref 135–450)
PMV BLD AUTO: 8.5 FL (ref 5–10.5)
POTASSIUM REFLEX MAGNESIUM: 3.6 MMOL/L (ref 3.5–5.1)
RBC # BLD: 3.65 M/UL (ref 4–5.2)
SODIUM BLD-SCNC: 135 MMOL/L (ref 136–145)
TOTAL PROTEIN: 5.8 G/DL (ref 6.4–8.2)
WBC # BLD: 10.5 K/UL (ref 4–11)

## 2021-06-12 PROCEDURE — 85025 COMPLETE CBC W/AUTO DIFF WBC: CPT

## 2021-06-12 PROCEDURE — 99224 PR SBSQ OBSERVATION CARE/DAY 15 MINUTES: CPT | Performed by: OBSTETRICS & GYNECOLOGY

## 2021-06-12 PROCEDURE — 80053 COMPREHEN METABOLIC PANEL: CPT

## 2021-06-12 PROCEDURE — 83690 ASSAY OF LIPASE: CPT

## 2021-06-12 PROCEDURE — 1200000000 HC SEMI PRIVATE

## 2021-06-12 PROCEDURE — 36415 COLL VENOUS BLD VENIPUNCTURE: CPT

## 2021-06-12 PROCEDURE — 2580000003 HC RX 258: Performed by: INTERNAL MEDICINE

## 2021-06-12 PROCEDURE — 6360000002 HC RX W HCPCS: Performed by: INTERNAL MEDICINE

## 2021-06-12 RX ADMIN — SODIUM CHLORIDE, POTASSIUM CHLORIDE, SODIUM LACTATE AND CALCIUM CHLORIDE: 600; 310; 30; 20 INJECTION, SOLUTION INTRAVENOUS at 06:33

## 2021-06-12 RX ADMIN — SODIUM CHLORIDE, PRESERVATIVE FREE 10 ML: 5 INJECTION INTRAVENOUS at 10:00

## 2021-06-12 RX ADMIN — SODIUM CHLORIDE, POTASSIUM CHLORIDE, SODIUM LACTATE AND CALCIUM CHLORIDE: 600; 310; 30; 20 INJECTION, SOLUTION INTRAVENOUS at 16:10

## 2021-06-12 RX ADMIN — SODIUM CHLORIDE, POTASSIUM CHLORIDE, SODIUM LACTATE AND CALCIUM CHLORIDE: 600; 310; 30; 20 INJECTION, SOLUTION INTRAVENOUS at 20:03

## 2021-06-12 RX ADMIN — ONDANSETRON 4 MG: 2 INJECTION INTRAMUSCULAR; INTRAVENOUS at 20:03

## 2021-06-12 RX ADMIN — ONDANSETRON 4 MG: 2 INJECTION INTRAMUSCULAR; INTRAVENOUS at 10:27

## 2021-06-12 ASSESSMENT — PAIN SCALES - GENERAL: PAINLEVEL_OUTOF10: 0

## 2021-06-12 NOTE — PLAN OF CARE
Up and about in room today. No episode of emesis. Requested zofran once today for reported nausea. Took clear liquids most of day with general diet for supper. Took part of meal. Family here to visit today. Call light in reach.

## 2021-06-12 NOTE — PROGRESS NOTES
Pt assessment completed and charted. VSS. Pt a/ox4. Pt denies pain or nausea at this time. Pt eating chicken broth w/o any complaints at this time. Pt given supplies for shower. Pt denies any other needs at this time. Pt calls out appropriately. Pt is a low fall risk;  -Bed in lowest position and wheels locked. -Call light within reach.   -Bedside table within reach.   -Non-skid footwear in place.

## 2021-06-12 NOTE — PROGRESS NOTES
Hospitalist Progress Note      PCP: Katarzyna Camara APRN - CNP    Date of Admission: 6/9/2021    Chief Complaint: Nausea vomiting    Hospital Course: 39 y.o. female who presented to [facility] with N/V. PMHx significant for 12 week pregnancy, prior episodes of persistent N/V, s/p cholecystectomy and Marijuana use. Reports about 1 week of persistent vomiting, without much relief from anti-emetics. Denies having much abdominal pain. No fevers, chills. ED work-up demonstrated a mildly elevated Lipase, raising suspicion for Pancreatitis. RUQ US was negative, s/p cholecystectomy. Subjective: Patient complains of nausea denies any vomiting no abdominal pain would like to eat a regular diet. Medications:  Reviewed    Infusion Medications    sodium chloride      lactated ringers 150 mL/hr at 06/12/21 3193    sodium chloride       Scheduled Medications    sodium chloride flush  10 mL Intravenous 2 times per day    sodium chloride flush  5-40 mL Intravenous 2 times per day     PRN Meds: calcium carbonate, sodium chloride flush, sodium chloride, potassium chloride, magnesium sulfate, promethazine **OR** ondansetron, acetaminophen **OR** acetaminophen, sodium chloride flush, sodium chloride, acetaminophen      Intake/Output Summary (Last 24 hours) at 6/12/2021 0920  Last data filed at 6/12/2021 0520  Gross per 24 hour   Intake 3497.5 ml   Output 3100 ml   Net 397.5 ml       Physical Exam Performed:    BP 99/63   Pulse 74   Temp 98.4 °F (36.9 °C) (Oral)   Resp 16   Wt 128 lb 4.8 oz (58.2 kg)   LMP 03/16/2021 (Exact Date)   SpO2 98%   BMI 25.06 kg/m²     General appearance: No apparent distress, appears stated age and cooperative. HEENT: Pupils equal, round, and reactive to light. Conjunctivae/corneas clear. Neck: Supple, with full range of motion. No jugular venous distention. Trachea midline. Respiratory:  Normal respiratory effort.  Clear to auscultation, bilaterally without Rales/Wheezes/Rhonchi. Cardiovascular: Regular rate and rhythm with normal S1/S2 without murmurs, rubs or gallops. Abdomen: Soft, non-tender, non-distended with normal bowel sounds. Musculoskeletal: No clubbing, cyanosis or edema bilaterally. Full range of motion without deformity. Skin: Skin color, texture, turgor normal.  No rashes or lesions. Neurologic:  Neurovascularly intact without any focal sensory/motor deficits. Cranial nerves: II-XII intact, grossly non-focal.  Psychiatric: Alert and oriented, thought content appropriate, normal insight  Capillary Refill: Brisk,3 seconds, normal   Peripheral Pulses: +2 palpable, equal bilaterally       Labs:   Recent Labs     06/09/21  1620 06/11/21  0648 06/12/21  0631   WBC 18.0* 14.9* 10.5   HGB 12.8 12.0 12.0   HCT 37.1 35.0* 35.2*    241 222     Recent Labs     06/09/21  1820 06/11/21  0648 06/12/21  0631   * 130* 135*   K 3.7 3.7 3.6    100 106   CO2 24 20* 21   BUN 5* 3* 5*   CREATININE <0.5* <0.5* <0.5*   CALCIUM 9.2 8.8 8.9     Recent Labs     06/09/21  1820 06/11/21  0648 06/12/21  0631   AST 13* 12* 10*   ALT 15 9* 10   BILITOT <0.2 0.4 <0.2   ALKPHOS 50 47 41     No results for input(s): INR in the last 72 hours. No results for input(s): Denise Grumet in the last 72 hours. Urinalysis:      Lab Results   Component Value Date    NITRU Negative 06/09/2021    WBCUA 6-10 10/17/2019    BACTERIA 4+ 10/17/2019    RBCUA 3-5 10/17/2019    BLOODU Negative 06/09/2021    SPECGRAV 1.015 06/09/2021    GLUCOSEU Negative 06/09/2021    GLUCOSEU 250 07/19/2010       Radiology:  No orders to display           Assessment/Plan:    Active Hospital Problems    Diagnosis     12 weeks gestation of pregnancy [Z3A.12]     Pancreatitis, unspecified pancreatitis type [K85.90]      1.   This is a 40-year-old female admitted with a nausea vomiting acute pancreatitis mildly elevated lipase level right upper quadrant ultrasound negative continue with IV fluid tolerating clear liquid with check lipase level consider advancing the diet. 2.  Urine drug screen positive for marijuana. 3.  Leukocytosis improving. 4.  12 weeks of IUP OB following. DVT Prophylaxis: Lovenox subcu  Diet: ADULT DIET;  Clear Liquid  Code Status: Full Code    PT/OT Eval Status:     Megan Moran MD

## 2021-06-12 NOTE — ED PROVIDER NOTES
I independently examined and evaluated Charlotte Page. In brief, Charlotte Page is a 39 y.o. female with a past medical history of seizure, IBS, and GERD, who presents to the ED complaining of emesis. Patient is G1, P0 at 12 weeks who presents from her OB appointment for evaluation of intractable emesis. She has been vomiting for a week. Last menstrual period 4 weeks ago. Nonbloody nonbilious emesis. Unable to keep her prenatal vitamins down. She has been taking Phenergan prescribed by the OB without relief. Patient did have her 12-week ultrasound today without any acute concerns, fetal heart rate in the 140s. Patient denies any abdominal pain, dysuria, vaginal bleeding or discharge. She denies concern for sexually transmitted infection. She denies chest pain or cough or shortness of breath or fever. Focused exam revealed   PHYSICAL EXAM  /63   Pulse 58   Temp 97.9 °F (36.6 °C) (Infrared)   Resp 16   Ht 5' (1.524 m)   Wt 129 lb (58.5 kg)   LMP 03/16/2021 (Exact Date)   SpO2 99%   BMI 25.19 kg/m²    GENERAL APPEARANCE: Awake and alert. Cooperative. no distress. HENT: Normocephalic. Atraumatic. Mucous membranes are dry  NECK: Supple. Full range of motion of the neck without stiffness or pain. EYES: PERRL. EOM's grossly intact. HEART/CHEST: RRR. No murmurs. Chest wall is not tender to palpation. LUNGS: Respirations unlabored. CTAB. Good air exchange. Speaking comfortably in full sentences. ABDOMEN: No tenderness. Soft. Non-distended. No msses. No organomegaly. No guarding or rebound. MUSCULOSKELETAL: No extremity edema. Compartments soft. No deformity. No tenderness in the extremities. All extremities neurovascularly intact. SKIN: Warm and dry. No acute rashes. NEUROLOGICAL: Alert and oriented. CN's 2-12 intact. No gross facial drooping. Strength 5/5, sensation intact. PSYCHIATRIC: Normal mood and affect.       ED course / MDM:   Overall well appearing patient, in no acute distress, presenting for intractable vomiting. Patient sent from [facility] where she had a reassuring 12-week ultrasound that confirmed IUP. Physical exam unremarkable. Differential diagnosis includes but is not limited to: Hyperemesis gravidarum, pancreatitis, hepatitis, cholecystitis, cannabis hyperemesis, gastritis, electrolyte abnormality    US GALLBLADDER RUQ   Final Result   Status post cholecystectomy. No biliary dilatation. Covid swab negative. Ethanol level negative. Lactate within normal limits. Urinalysis shows ketones, no evidence of blood or infection. No significant electrolyte abnormalities or evidence of kidney dysfunction. Liver function testing unremarkable. Low suspicion for hepatitis or other acute liver pathology. Patient does have transaminitis to 18, no anemia or thrombocytopenia. Suspect that leukocytosis may be reactive or related to pancreatitis. Patient does have elevated lipase, this raises concern for pancreatitis. We'll plan to provide fluids and antiemetics. Right upper quadrant ultrasound shows the patient is status post cholecystectomy, no obvious abnormality of the pancreas on right upper quadrant ultrasound. Patient denies any pain at this time. Given that patient has had intractable vomiting at home resistant to outpatient treatment and has pancreatitis while pregnant, do feel that she warrants admission at this time. Patient will be transferred to [facility] for further management. Urine drug screen noted to be positive for cannabis, this may also be the cause of the patient's hyperemesis. Patient counseled to stop using marijuana. At this time, do feel the patient requires admission for further work-up and management. Discussed the patient with hospital team.      CLINICAL IMPRESSION  1. Acute pancreatitis, unspecified complication status, unspecified pancreatitis type    2.  Nausea and vomiting during pregnancy Blood pressure 120/63, pulse 58, temperature 97.9 °F (36.6 °C), temperature source Infrared, resp. rate 16, height 5' (1.524 m), weight 129 lb (58.5 kg), last menstrual period 03/16/2021, SpO2 99 %, not currently breastfeeding. DISPOSITION  Nilda Puente was transferred to [facility] in stable condition. All diagnostic, treatment, and disposition decisions were made by myself in conjunction with the advanced practice provider. For all further details of the patient's emergency department visit, please see the advanced practice provider's documentation. Comment: Please note this report has been produced using speech recognition software and may contain errors related to that system including errors in grammar, punctuation, and spelling, as well as words and phrases that may be inappropriate. If there are any questions or concerns please feel free to contact the dictating provider for clarification.         Tejal Villegas MD  06/12/21 8859

## 2021-06-13 VITALS
WEIGHT: 128.3 LBS | DIASTOLIC BLOOD PRESSURE: 75 MMHG | RESPIRATION RATE: 16 BRPM | OXYGEN SATURATION: 100 % | HEART RATE: 81 BPM | BODY MASS INDEX: 25.06 KG/M2 | TEMPERATURE: 98.1 F | SYSTOLIC BLOOD PRESSURE: 121 MMHG

## 2021-06-13 LAB — LIPASE: 35 U/L (ref 13–60)

## 2021-06-13 PROCEDURE — 36415 COLL VENOUS BLD VENIPUNCTURE: CPT

## 2021-06-13 PROCEDURE — 83690 ASSAY OF LIPASE: CPT

## 2021-06-13 PROCEDURE — 2580000003 HC RX 258: Performed by: INTERNAL MEDICINE

## 2021-06-13 RX ADMIN — SODIUM CHLORIDE, POTASSIUM CHLORIDE, SODIUM LACTATE AND CALCIUM CHLORIDE: 600; 310; 30; 20 INJECTION, SOLUTION INTRAVENOUS at 07:21

## 2021-06-13 NOTE — DISCHARGE SUMMARY
Hospital Medicine Discharge Summary    Patient ID: Ion George      Patient's PCP: Spencer Ahumada, RADHA Barker CNP    Admit Date: 2021     Discharge Date: 2021      Admitting Physician: Katie Corrigan DO     Discharge Physician: Army Yesenia MD     Discharge Diagnoses: Active Hospital Problems    Diagnosis     12 weeks gestation of pregnancy [Z3A.12]     Pancreatitis, unspecified pancreatitis type [K85.90]        The patient was seen and examined on day of discharge and this discharge summary is in conjunction with any daily progress note from day of discharge. Hospital Course:   39 y. o. female who presented to [facility] with N/V.  PMHx significant for 12 week pregnancy, prior episodes of persistent N/V, s/p cholecystectomy and Marijuana use. Reports about 1 week of persistent vomiting, without much relief from anti-emetics. Denies having much abdominal pain. No fevers, chills. ED work-up demonstrated a mildly elevated Lipase, raising suspicion for Pancreatitis. RUQ US was negative, s/p cholecystectomy.   1.  This is a 48-year-old female admitted with nausea vomiting acute pancreatitis mildly elevated lipase level right upper quadrant ultrasound negative status post cholecystectomy LFT within normal limits patient is tolerating regular diet lipase 35 today patient denies any abdomen pain will discharge patient home to follow-up with PCP within a week. 2.  Urine drug screen positive for marijuana. Counseled and educated recommended patient to stop using marijuana  3.  Leukocytosis improved no evidence of infection  4.     1 para 0 at 12+ weeks of IUP with a EDC of 21 patient to continue with the prenatal vitamin follow-up with OB/GYN. 5.  Tobacco abuse recommended patient to stop smoking.       Physical Exam Performed:     /75   Pulse 81   Temp 98.1 °F (36.7 °C) (Oral)   Resp 16   Wt 128 lb 4.8 oz (58.2 kg)   LMP 2021 (Exact Date)   SpO2 100%   BMI General      Discharge Medications:     Discharge Medication List as of 6/13/2021 12:37 PM           Details   Prenatal MV-Min-Fe Fum-FA-DHA (PRENATAL 1 PO) Take by mouthHistorical Med             Time Spent on discharge is more than 35 minutes in the examination, evaluation, counseling and review of medications and discharge plan. Signed:    Vincenzo Leblanc MD   6/13/2021      Thank you RADHA Tariq CNP for the opportunity to be involved in this patient's care. If you have any questions or concerns please feel free to contact me at [phone].

## 2021-06-13 NOTE — PROGRESS NOTES
Pt assessment completed and charted. VSS. Pt a/ox4. Pt denies pain. Pt given Zofran, pt had some nausea very mild after eat general diet for dinner. Pt denies any other needs at this time. Pt calls out appropriately.         Pt is a low fall risk;  -Bed in lowest position and wheels locked. -Call light within reach.   -Bedside table within reach.   -Non-skid footwear in place.

## 2021-06-13 NOTE — PROGRESS NOTES
Hospitalist Progress Note      PCP: RADHA Rojas - CNP    Date of Admission: 6/9/2021    Chief Complaint: Nausea vomiting    Hospital Course: 39 y. o. female who presented to [facility] with N/V.  PMHx significant for 12 week pregnancy, prior episodes of persistent N/V, s/p cholecystectomy and Marijuana use. Reports about 1 week of persistent vomiting, without much relief from anti-emetics. Denies having much abdominal pain. No fevers, chills. ED work-up demonstrated a mildly elevated Lipase, raising suspicion for Pancreatitis. RUQ US was negative, s/p cholecystectomy. Subjective: Patient denies any chest pain no shortness of breath no nausea vomiting noted abdomen pain tolerating regular diet. She smokes half pack per day for 23 years drinks alcohol occasionally also uses marijuana once or twice per day. This is her first pregnancy with EDC of 12/21/2021      Medications:  Reviewed    Infusion Medications    sodium chloride      lactated ringers 150 mL/hr at 06/13/21 0721    sodium chloride       Scheduled Medications    sodium chloride flush  10 mL Intravenous 2 times per day    sodium chloride flush  5-40 mL Intravenous 2 times per day     PRN Meds: calcium carbonate, sodium chloride flush, sodium chloride, potassium chloride, magnesium sulfate, promethazine **OR** ondansetron, acetaminophen **OR** acetaminophen, sodium chloride flush, sodium chloride, acetaminophen      Intake/Output Summary (Last 24 hours) at 6/13/2021 0939  Last data filed at 6/13/2021 0835  Gross per 24 hour   Intake 3606 ml   Output 1300 ml   Net 2306 ml       Physical Exam Performed:    /75   Pulse 81   Temp 98.1 °F (36.7 °C) (Oral)   Resp 16   Wt 128 lb 4.8 oz (58.2 kg)   LMP 03/16/2021 (Exact Date)   SpO2 100%   BMI 25.06 kg/m²     General appearance: No apparent distress, appears stated age and cooperative. HEENT: Pupils equal, round, and reactive to light. Conjunctivae/corneas clear.   Neck: upper quadrant ultrasound negative status post cholecystectomy LFT within normal limits patient is tolerating regular diet lipase 35 today patient denies any abdomen pain will discharge patient home to follow-up with PCP within a week. 2.  Urine drug screen positive for marijuana. Counseled and educated recommended patient to stop using marijuana  3. Leukocytosis improved no evidence of infection  4.  1 para 0 at 12+ weeks of IUP with a EDC of 21 patient to continue with the prenatal vitamin follow-up with OB/GYN. 5.  Tobacco abuse recommended patient to stop smoking. DVT Prophylaxis: Lovenox subcu  Diet: ADULT DIET;  Regular  Code Status: Full Code    PT/OT Eval Status:     Amalia Sosa MD

## 2021-06-13 NOTE — PROGRESS NOTES
Shift assessment updated. Patient a/ox4. VSS. Patient tolerating general diet. Denies abd pain/nausea. Call light in reach.

## 2021-06-13 NOTE — DISCHARGE INSTR - COC
Continuity of Care Form    Patient Name: Olga Chen   :  1985  MRN:  [id number]    Admit date:  2021  Discharge date:  ***    Code Status Order: Full Code   Advance Directives:   Advance Care Flowsheet Documentation     Date/Time Healthcare Directive Type of Healthcare Directive Copy in [address] Agent's Name Healthcare Agent's Phone Number    21 1334  -- Pt is discussion AD's w/family and plans to complete -- -- -- -- --    21 9204  No, patient does not have an advance directive for healthcare treatment -- -- -- -- --          Admitting Physician:  Farzana Gillette DO  PCP: RADHA Campos CNP    Discharging Nurse: [facility] Unit/Room#: 2398/9489-00  Discharging Unit Phone Number: ***    Emergency Contact:   Extended Emergency Contact Information  Primary Emergency Contact: [facility] Phone: [phone]  Relation: Parent    Past Surgical History:  Past Surgical History:   Procedure Laterality Date    APPENDECTOMY  2010    CHOLECYSTECTOMY  2010    ENDOSCOPY, COLON, DIAGNOSTIC  7/3/14    OTHER SURGICAL HISTORY  12/8/15    Excision scalp lesion    OVARIAN CYST REMOVAL  2003       Immunization History:   Immunization History   Administered Date(s) Administered    Influenza Virus Vaccine 10/10/2011       Active Problems:  Patient Active Problem List   Diagnosis Code    GERD (gastroesophageal reflux disease) K21.9    Irritable bowel syndrome K58.9    Seizures ([facility] [address] R56.9    Depression, endogenous ([facility] [address] F33.2    Nausea and vomiting R11.2    Cannabis abuse F12.10    Leucocytosis D72.829    Neoplasm of uncertain behavior of skin D48.5    Pancreatitis, unspecified pancreatitis type K85.90    12 weeks gestation of pregnancy Z3A.12       Isolation/Infection:   Isolation          No Isolation        Patient Infection Status     Infection Onset Added Last Indicated Last Indicated By Review Planned Expiration Resolved Resolved By    None active    Resolved    COVID-19 Rule Out 21 COVID-19, Rapid (Ordered)   21 Rule-Out Test Resulted    COVID-19 Rule Out 21 COVID-19 & Influenza Combo (Ordered)   21 Rule-Out Test Resulted          Nurse Assessment:  Last Vital Signs: /75   Pulse 81   Temp 98.1 °F (36.7 °C) (Oral)   Resp 16   Wt 128 lb 4.8 oz (58.2 kg)   LMP 2021 (Exact Date)   SpO2 100%   BMI 25.06 kg/m²     Last documented pain score (0-10 scale): Pain Level: 0  Last Weight:   Wt Readings from Last 1 Encounters:   21 128 lb 4.8 oz (58.2 kg)     Mental Status:  {IP PT MENTAL STATUS:}    IV Access:  { GIACOMO IV ACCESS:303269054}    Nursing Mobility/ADLs:  Walking   {CHP DME ULXF:857206860}  Transfer  {CHP DME TSO}  Bathing  {CHP DME IVDB:158707655}  Dressing  {CHP DME BETQ:406697438}  Toileting  {CHP DME BMAN:594550889}  Feeding  {CHP DME TMLF:169992434}  Med Admin  {CHP DME QLPA:457820379}  Med Delivery   { GIACOMO MED Delivery:821743946}    Wound Care Documentation and Therapy:        Elimination:  Continence:   · Bowel: {YES / DESIRE:43280}  · Bladder: {YES / PQ:98897}  Urinary Catheter: {Urinary Catheter:828766812}   Colostomy/Ileostomy/Ileal Conduit: {YES / DQ:08363}       Date of Last BM: ***    Intake/Output Summary (Last 24 hours) at 2021 1243  Last data filed at 2021 1147  Gross per 24 hour   Intake 3686 ml   Output 1550 ml   Net 2136 ml     I/O last 3 completed shifts: In: 3064 [P.O.:1220;  I.V.:1844]  Out: 1900 [Urine:1900]    Safety Concerns:     { GIACOMO Safety Concerns:209919083}    Impairments/Disabilities:      { GIACOMO Impairments/Disabilities:760437383}    Nutrition Therapy:  Current Nutrition Therapy:   [address] GIACOMO Diet List:917160500}    Routes of Feeding: {CHP DME Other Feedings:107286331}  Liquids: {Slp liquid thickness:77551}  Daily Fluid Restriction: {CHP DME Yes amt example:583475366}  Last Modified Barium Swallow with Video (Video Swallowing Test): {Done Not Done BHXX:786523582}    Treatments at the Time of Hospital Discharge:   Respiratory Treatments: ***  Oxygen Therapy:  {Therapy; copd oxygen:15904}  Ventilator:    {[facility] Vent BBLU:741243396}    Rehab Therapies: {THERAPEUTIC INTERVENTION:9406599358}  Weight Bearing Status/Restrictions: {[facility] Weight Bearin}  Other Medical Equipment (for information only, NOT a DME order):  {EQUIPMENT:707349122}  Other Treatments: ***    Patient's personal belongings (please select all that are sent with patient):  {[facility] DME Belongings:000839571}    RN SIGNATURE:  {Esignature:354512341}    CASE MANAGEMENT/SOCIAL WORK SECTION    Inpatient Status Date: ***    Readmission Risk Assessment Score:  Readmission Risk              Risk of Unplanned Readmission:  10           Discharging to Facility/ Agency   · Name:   · Address:  · Phone:  · Fax:    Dialysis Facility (if applicable)   · Name:  · Address:  · Dialysis Schedule:  · Phone:  · Fax:    / signature: {Esignature:557682954}    PHYSICIAN SECTION    Prognosis: {Prognosis:2796998694}    Condition at Discharge: [address] [place] [address] Patient Condition:690898244}    Rehab Potential (if transferring to Rehab): {Prognosis:4096541207}    Recommended Labs or Other Treatments After Discharge: ***    Physician Certification: I certify the above information and transfer of Iris Montez  is necessary for the continuing treatment of the diagnosis listed and that she requires {Admit to Appropriate Level of Care:28772} for {GREATER/LESS:098025442} 30 days.      Update Admission H&P: {CHP DME Changes in IAVQJ:734778355}    PHYSICIAN SIGNATURE:  {Esignature:632796907}

## 2021-06-13 NOTE — PROGRESS NOTES
Department of Ob/Gyn  Attending Progress Note          SUBJECTIVE:  Pt doing well today, states she has been feeling much better. Nausea improved, tolerating regular diet at this time. OBJECTIVE:           Physical Exam  VITALS:  /79   Pulse 66   Temp 97.8 °F (36.6 °C) (Oral)   Resp 16   Wt 128 lb 4.8 oz (58.2 kg)   LMP 03/16/2021 (Exact Date)   SpO2 99%   BMI 25.06 kg/m²   CONSTITUTIONAL:  awake, alert, cooperative, no apparent distress, and appears stated age  LUNGS:  Nonlabored respirations  CARDIOVASCULAR:  Well perfused  ABDOMEN: Soft, NT/ND, no jeniffer/guarding    DATA:  CBC:   Lab Results   Component Value Date    WBC 10.5 06/12/2021    RBC 3.65 06/12/2021    HGB 12.0 06/12/2021    HCT 35.2 06/12/2021    MCV 96.5 06/12/2021    MCH 32.9 06/12/2021    MCHC 34.1 06/12/2021    RDW 13.6 06/12/2021     06/12/2021    MPV 8.5 06/12/2021     CMP:    Lab Results   Component Value Date     06/12/2021    K 3.6 06/12/2021     06/12/2021    CO2 21 06/12/2021    BUN 5 06/12/2021    CREATININE <0.5 06/12/2021    GFRAA >60 06/12/2021    GFRAA >60 11/29/2010    AGRATIO 1.4 06/12/2021    LABGLOM >60 06/12/2021    GLUCOSE 83 06/12/2021    PROT 5.8 06/12/2021    PROT 6.9 03/28/2012    LABALBU 3.4 06/12/2021    LABALBU 4.4 12/24/2011    CALCIUM 8.9 06/12/2021    BILITOT <0.2 06/12/2021    BILITOT 0.2 12/24/2011    ALKPHOS 41 06/12/2021    AST 10 06/12/2021    ALT 10 06/12/2021     LIPASE:    Lab Results   Component Value Date    LIPASE 150.0 06/12/2021       ASSESSMENT AND PLAN:    Principal Problem:    Pancreatitis, unspecified pancreatitis type  Plan: per primary team    Active Problems:    12 weeks gestation of pregnancy  Plan: Per pt, plan is for txfr of prenatal care to  due to [place] and untreated seizure disorder, last gran mal seizure 2018 after stopping tegretol. Care transfer was discussed w/pt at recent office visit and is in process.  Adelina Cosme office staff to assist pt with scheduling intake prenatal visit at [facility].  - plan fetal heart tones prior to discharge    Dony Rush MD

## 2021-08-05 ENCOUNTER — HOSPITAL ENCOUNTER (EMERGENCY)
Age: 36
Discharge: HOME | End: 2021-08-06
Attending: EMERGENCY MEDICINE
Payer: MEDICARE

## 2021-08-05 VITALS
OXYGEN SATURATION: 98 % | HEIGHT: 60 IN | BODY MASS INDEX: 27.88 KG/M2 | RESPIRATION RATE: 16 BRPM | WEIGHT: 142 LBS | HEART RATE: 68 BPM | SYSTOLIC BLOOD PRESSURE: 123 MMHG | DIASTOLIC BLOOD PRESSURE: 69 MMHG | TEMPERATURE: 98.1 F

## 2021-08-05 DIAGNOSIS — R11.15 CYCLIC VOMITING SYNDROME: Primary | ICD-10-CM

## 2021-08-05 LAB
A/G RATIO: 1.6 (ref 1.1–2.2)
ALBUMIN SERPL-MCNC: 4.4 G/DL (ref 3.4–5)
ALP BLD-CCNC: 63 U/L (ref 40–129)
ALT SERPL-CCNC: 15 U/L (ref 10–40)
AMORPHOUS: ABNORMAL /HPF
ANION GAP SERPL CALCULATED.3IONS-SCNC: 14 MMOL/L (ref 3–16)
AST SERPL-CCNC: 16 U/L (ref 15–37)
BACTERIA: ABNORMAL /HPF
BASOPHILS ABSOLUTE: 0.1 K/UL (ref 0–0.2)
BASOPHILS RELATIVE PERCENT: 0.4 %
BILIRUB SERPL-MCNC: 0.3 MG/DL (ref 0–1)
BILIRUBIN URINE: NEGATIVE
BLOOD, URINE: ABNORMAL
BUN BLDV-MCNC: 9 MG/DL (ref 7–20)
CALCIUM SERPL-MCNC: 9.6 MG/DL (ref 8.3–10.6)
CHLORIDE BLD-SCNC: 102 MMOL/L (ref 99–110)
CLARITY: CLEAR
CO2: 19 MMOL/L (ref 21–32)
COLOR: YELLOW
CREAT SERPL-MCNC: <0.5 MG/DL (ref 0.6–1.1)
EOSINOPHILS ABSOLUTE: 0 K/UL (ref 0–0.6)
EOSINOPHILS RELATIVE PERCENT: 0.1 %
EPITHELIAL CELLS, UA: ABNORMAL /HPF (ref 0–5)
GFR AFRICAN AMERICAN: >60
GFR NON-AFRICAN AMERICAN: >60
GLOBULIN: 2.7 G/DL
GLUCOSE BLD-MCNC: 128 MG/DL (ref 70–99)
GLUCOSE URINE: NEGATIVE MG/DL
HCG QUALITATIVE: POSITIVE
HCT VFR BLD CALC: 35.3 % (ref 36–48)
HEMOGLOBIN: 12 G/DL (ref 12–16)
KETONES, URINE: >=80 MG/DL
LEUKOCYTE ESTERASE, URINE: NEGATIVE
LIPASE: 12 U/L (ref 13–60)
LYMPHOCYTES ABSOLUTE: 1.5 K/UL (ref 1–5.1)
LYMPHOCYTES RELATIVE PERCENT: 6.5 %
MAGNESIUM: 1.8 MG/DL (ref 1.8–2.4)
MCH RBC QN AUTO: 32.6 PG (ref 26–34)
MCHC RBC AUTO-ENTMCNC: 34.1 G/DL (ref 31–36)
MCV RBC AUTO: 95.7 FL (ref 80–100)
MICROSCOPIC EXAMINATION: YES
MONOCYTES ABSOLUTE: 0.2 K/UL (ref 0–1.3)
MONOCYTES RELATIVE PERCENT: 1 %
MUCUS: ABNORMAL /LPF
NEUTROPHILS ABSOLUTE: 20.8 K/UL (ref 1.7–7.7)
NEUTROPHILS RELATIVE PERCENT: 92 %
NITRITE, URINE: NEGATIVE
PDW BLD-RTO: 13.5 % (ref 12.4–15.4)
PH UA: 5.5 (ref 5–8)
PLATELET # BLD: 306 K/UL (ref 135–450)
PMV BLD AUTO: 8.9 FL (ref 5–10.5)
POTASSIUM REFLEX MAGNESIUM: 3.5 MMOL/L (ref 3.5–5.1)
PROTEIN UA: ABNORMAL MG/DL
RBC # BLD: 3.69 M/UL (ref 4–5.2)
RBC UA: ABNORMAL /HPF (ref 0–4)
SODIUM BLD-SCNC: 135 MMOL/L (ref 136–145)
SPECIFIC GRAVITY UA: >=1.03 (ref 1–1.03)
TOTAL PROTEIN: 7.1 G/DL (ref 6.4–8.2)
URINE TYPE: ABNORMAL
UROBILINOGEN, URINE: 0.2 E.U./DL
WBC # BLD: 22.5 K/UL (ref 4–11)
WBC UA: ABNORMAL /HPF (ref 0–5)

## 2021-08-05 PROCEDURE — 83690 ASSAY OF LIPASE: CPT

## 2021-08-05 PROCEDURE — 81001 URINALYSIS AUTO W/SCOPE: CPT

## 2021-08-05 PROCEDURE — 83735 ASSAY OF MAGNESIUM: CPT

## 2021-08-05 PROCEDURE — 84703 CHORIONIC GONADOTROPIN ASSAY: CPT

## 2021-08-05 PROCEDURE — 6370000000 HC RX 637 (ALT 250 FOR IP): Performed by: EMERGENCY MEDICINE

## 2021-08-05 PROCEDURE — 99284 EMERGENCY DEPT VISIT MOD MDM: CPT

## 2021-08-05 PROCEDURE — 6360000002 HC RX W HCPCS: Performed by: EMERGENCY MEDICINE

## 2021-08-05 PROCEDURE — 85025 COMPLETE CBC W/AUTO DIFF WBC: CPT

## 2021-08-05 PROCEDURE — 2580000003 HC RX 258: Performed by: EMERGENCY MEDICINE

## 2021-08-05 PROCEDURE — 36415 COLL VENOUS BLD VENIPUNCTURE: CPT

## 2021-08-05 PROCEDURE — 80053 COMPREHEN METABOLIC PANEL: CPT

## 2021-08-05 PROCEDURE — 96372 THER/PROPH/DIAG INJ SC/IM: CPT

## 2021-08-05 RX ORDER — 0.9 % SODIUM CHLORIDE 0.9 %
1000 INTRAVENOUS SOLUTION INTRAVENOUS ONCE
Status: COMPLETED | OUTPATIENT
Start: 2021-08-05 | End: 2021-08-05

## 2021-08-05 RX ORDER — ONDANSETRON 4 MG/1
4 TABLET, ORALLY DISINTEGRATING ORAL ONCE
Status: COMPLETED | OUTPATIENT
Start: 2021-08-05 | End: 2021-08-05

## 2021-08-05 RX ORDER — 0.9 % SODIUM CHLORIDE 0.9 %
1000 INTRAVENOUS SOLUTION INTRAVENOUS ONCE
Status: COMPLETED | OUTPATIENT
Start: 2021-08-05 | End: 2021-08-06

## 2021-08-05 RX ORDER — PROMETHAZINE HYDROCHLORIDE 25 MG/ML
6.25 INJECTION, SOLUTION INTRAMUSCULAR; INTRAVENOUS ONCE
Status: COMPLETED | OUTPATIENT
Start: 2021-08-05 | End: 2021-08-05

## 2021-08-05 RX ADMIN — PROMETHAZINE HYDROCHLORIDE 6.25 MG: 25 INJECTION INTRAMUSCULAR; INTRAVENOUS at 21:51

## 2021-08-05 RX ADMIN — SODIUM CHLORIDE 1000 ML: 9 INJECTION, SOLUTION INTRAVENOUS at 23:15

## 2021-08-05 RX ADMIN — ONDANSETRON 4 MG: 4 TABLET, ORALLY DISINTEGRATING ORAL at 21:31

## 2021-08-05 RX ADMIN — SODIUM CHLORIDE 1000 ML: 9 INJECTION, SOLUTION INTRAVENOUS at 21:50

## 2021-08-06 PROCEDURE — 6360000002 HC RX W HCPCS: Performed by: EMERGENCY MEDICINE

## 2021-08-06 PROCEDURE — 96374 THER/PROPH/DIAG INJ IV PUSH: CPT

## 2021-08-06 RX ORDER — ONDANSETRON 2 MG/ML
4 INJECTION INTRAMUSCULAR; INTRAVENOUS ONCE
Status: COMPLETED | OUTPATIENT
Start: 2021-08-06 | End: 2021-08-06

## 2021-08-06 RX ADMIN — ONDANSETRON HYDROCHLORIDE 4 MG: 2 INJECTION, SOLUTION INTRAMUSCULAR; INTRAVENOUS at 00:20

## 2021-08-06 NOTE — ED PROVIDER NOTES
Emergency Department Attending Note    Vibha Crenshaw MD    Date of ED VIsit: 8/5/2021    CHIEF COMPLAINT  Emesis (Chronic cyclical vomiting)      HISTORY OF PRESENT ILLNESS  Aime Hernandez is a 39 y.o. female  With Vital signs of /69   Pulse 68   Temp 98.1 °F (36.7 °C) (Oral)   Resp 16   Ht 5' (1.524 m)   Wt 142 lb (64.4 kg)   LMP 03/16/2021 (Exact Date)   SpO2 98%   Breastfeeding No   BMI 27.73 kg/m²  who presents to the ED with a complaint of 1 day of emesis which included 12-24 bouts of vomiting. Patient has a past history of cyclic vomiting syndrome. She is currently 5 months pregnant. And still smokes prior marijuana. It has not been necessarily contributed to the marijuana but my suspicion is high. She has had a little bit of diarrhea with this as well neither of which have had blood. She does not have a gallbladder she does not have an appendix. Patient states that usually Zofran does not work only [employer] works. So she will be treated with that here in the emergency department. She has no abdominal pain with this no  symptoms. No chest pain no shortness of breath. No cough. No fever or chills. Patient seen and evaluated in room 3. No other complaints, modifying factors or associated symptoms. Patients Past medical history reviewed and listed below  Past Medical History:   Diagnosis Date    Anemia (iron deficiency)     Depression, endogenous (HCC)     GERD (gastroesophageal reflux disease)     Heart murmur     Irritable bowel syndrome     Seizures ([address]     Substance abuse ([address] 11/11    marijuana, benzos BCGH    Ulcer      Past Surgical History:   Procedure Laterality Date    APPENDECTOMY  4/2010    CHOLECYSTECTOMY  4/2010    ENDOSCOPY, COLON, DIAGNOSTIC  7/3/14    OTHER SURGICAL HISTORY  12/8/15    Excision scalp lesion    OVARIAN CYST REMOVAL  6/2003       I have reviewed the following from the nursing documentation.     Family History   Problem Relation Age of Onset    Cancer Other     Diabetes Other     Heart Disease Other     High Blood Pressure Other     Kidney Disease Other     Seizures Other     Stroke Other     Lupus Other      Social History     Socioeconomic History    Marital status: Legally      Spouse name: Not on file    Number of children: Not on file    Years of education: Not on file    Highest education level: Not on file   Occupational History    Occupation: Unemployed   Tobacco Use    Smoking status: Current Every Day Smoker     Packs/day: 0.50     Years: 12.00     Pack years: 6.00     Types: Cigarettes    Smokeless tobacco: Never Used   Substance and Sexual Activity    Alcohol use: No    Drug use: Yes     Types: Marijuana     Comment: weekly- 2 to 3 times a day    Sexual activity: Yes     Partners: Male   Other Topics Concern    Not on file   Social History Narrative    Not on file     Social Determinants of Health     Financial Resource Strain:     Difficulty of Paying Living Expenses:    Food Insecurity:     Worried About Running Out of Food in the Last Year:     Ran Out of Food in the Last Year:    Transportation Needs:     Lack of Transportation (Medical):      Lack of Transportation (Non-Medical):    Physical Activity:     Days of Exercise per Week:     Minutes of Exercise per Session:    Stress:     Feeling of Stress :    Social Connections:     Frequency of Communication with Friends and Family:     Frequency of Social Gatherings with Friends and Family:     Attends Orthodox Services:     Active Member of Clubs or Organizations:     Attends Club or Organization Meetings:     Marital Status:    Intimate Partner Violence:     Fear of Current or Ex-Partner:     Emotionally Abused:     Physically Abused:     Sexually Abused:      Current Facility-Administered Medications   Medication Dose Route Frequency Provider Last Rate Last Admin    ondansetron (ZOFRAN) injection 4 mg  4 mg Intravenous Once Alverda Mohs, MD         Current Outpatient Medications   Medication Sig Dispense Refill    Prenatal MV-Min-Fe Fum-FA-DHA (PRENATAL 1 PO) Take by mouth       Allergies   Allergen Reactions    Citalopram Hydrobromide [Citalopram Hydrobromide]      GI symptoms    Phenytoin Sodium Extended      itching       REVIEW OF SYSTEMS  10 systems reviewed, pertinent positives per HPI otherwise noted to be negative     PHYSICAL EXAM  /69   Pulse 68   Temp 98.1 °F (36.7 °C) (Oral)   Resp 16   Ht 5' (1.524 m)   Wt 142 lb (64.4 kg)   LMP 03/16/2021 (Exact Date)   SpO2 98%   Breastfeeding No   BMI 27.73 kg/m²   GENERAL APPEARANCE: Awake and alert. Cooperative. In no obvious distress but multiple bouts of vomiting. HEAD: Normocephalic. Atraumatic. EYES: PERRL. EOM's grossly intact. ENT: Mucous membranes are pink and moist.   NECK: Supple. HEART: RRR. No murmurs. LUNGS: Respirations unlabored. CTAB. Good air exchange. ABDOMEN: Soft. Non-distended. Non-tender. No masses. No organomegaly. No guarding or rebound. EXTREMITIES: No peripheral edema. Moves all extremities equally. All extremities neurovascularly intact. SKIN: Warm and dry. No acute rashes. NEUROLOGICAL: Alert and oriented. Strength 5/5, sensation intact. Gait normal.   PSYCHIATRIC: Normal mood and affect. No HI or SI expressed to me. RADIOLOGY    If acquired see below     EKG:     If acquired see below       ED COURSE/MDM    She will be treated with 3 L of normal saline based on her urinalysis which showed no infection but 80 ketones. Additionally she will be treated with Zofran ODT, Phenergan, Zofran IV. And reassessed for this cyclic vomiting syndrome. The one concerning lab was that of a white count of 22 with no infectious symptoms.     ED Course as of Aug 06 0019   Thu Aug 05, 2021   2215 hCG from the serum was positive   HCG Qualitative, Serum:    hCG Qual POSITIVE [DL]   2216 Comprehensive metabolic panel revealed a sodium of 135 CO2 of 19 with a glucose of 128 her creatinine was less than 5 GFR greater than 60   Comprehensive Metabolic Panel w/ Reflex to MG(!):    Sodium 135(!)   Potassium 3.5   Chloride 102   CO2 19(!)   Anion Gap 14   Glucose 128(!)   BUN 9   Creatinine <0.5(!)   GFR Non- >60   GFR African American >60   Calcium 9.6   Total Protein 7.1   Albumin 4.4   Albumin/Globulin Ratio 1.6   Bilirubin 0.3   Alk Phos 63   ALT 15   AST 16   Globulin 2.7 [DL]   2223 Magnesium was 1.8   Magnesium:    Magnesium 1.80 [DL]   2251 CBC reveals a white count of 22.5. H&H of 12 and 35 with a platelet count of 761   CBC Auto Differential(!):    WBC 22.5(!)   RBC 3.69(!)   Hemoglobin Quant 12.0   Hematocrit 35.3(!)   MCV 95.7   MCH 32.6   MCHC 34.1   RDW 13.5   Platelet Count 022   MPV 8.9   Neutrophils % 92.0   Lymphocyte % 6.5   Monocytes % 1.0   Eosinophils % 0.1   Basophils % 0.4   Neutrophils Absolute 20.8(!)   Lymphocytes Absolute 1.5   Monocytes Absolute 0.2   Eosinophils Absolute 0.0   Basophils Absolute 0.1 [DL]   2316 Lipase of 12   Lipase(!):    Lipase 12. 0(!) [DL]   2318 Urinalysis reveals 80 ketones trace blood negative nitrites negative leukocyte esterase   Urinalysis, reflex to microscopic(!):    Color, UA Yellow   Clarity, UA Clear   Glucose, UA Negative   Bilirubin, Urine Negative   Ketones, Urine >=80(!)   Specific Gravity, UA >=1.030   Blood, Urine TRACE-INTACT(!)   pH, UA 5.5   Protein, UA TRACE(!)   Urobilinogen, Urine 0.2   Nitrite, Urine Negative   Leukocyte Esterase, Urine Negative   Microscopic Examination YES   Urine Type NotGiven [DL]   2445 Bobby scopic urinalysis revealed rare mucus and rare bacteria.   No white cells no red cells no epithelial 2-5 epithelial cells   Microscopic Urinalysis(!):    Mucus, UA Rare(!)   WBC, UA 0-2   RBC, UA 0-2   Epithelial Cells, UA 2-5   Bacteria, UA Rare(!)   Amorphous, UA Rare [DL]      ED Course User Index  [DL] Yousif Thomas MD       The ED course and plan

## 2021-11-09 ENCOUNTER — HOSPITAL ENCOUNTER (EMERGENCY)
Age: 36
Discharge: HOME | End: 2021-11-09
Payer: MEDICARE

## 2021-11-09 VITALS
HEIGHT: 60 IN | RESPIRATION RATE: 18 BRPM | DIASTOLIC BLOOD PRESSURE: 70 MMHG | TEMPERATURE: 98.4 F | WEIGHT: 165 LBS | HEART RATE: 78 BPM | BODY MASS INDEX: 32.39 KG/M2 | SYSTOLIC BLOOD PRESSURE: 112 MMHG | OXYGEN SATURATION: 100 %

## 2021-11-09 DIAGNOSIS — D72.829 LEUKOCYTOSIS, UNSPECIFIED TYPE: ICD-10-CM

## 2021-11-09 DIAGNOSIS — E86.0 DEHYDRATION: ICD-10-CM

## 2021-11-09 DIAGNOSIS — O21.9 NAUSEA/VOMITING IN PREGNANCY: ICD-10-CM

## 2021-11-09 DIAGNOSIS — R11.15 CYCLIC VOMITING SYNDROME: Primary | ICD-10-CM

## 2021-11-09 LAB
A/G RATIO: 1.4 (ref 1.1–2.2)
ALBUMIN SERPL-MCNC: 4.1 G/DL (ref 3.4–5)
ALP BLD-CCNC: 156 U/L (ref 40–129)
ALT SERPL-CCNC: 40 U/L (ref 10–40)
ANION GAP SERPL CALCULATED.3IONS-SCNC: 19 MMOL/L (ref 3–16)
AST SERPL-CCNC: 34 U/L (ref 15–37)
BACTERIA: ABNORMAL /HPF
BANDED NEUTROPHILS RELATIVE PERCENT: 1 % (ref 0–7)
BASOPHILS ABSOLUTE: 0 K/UL (ref 0–0.2)
BASOPHILS RELATIVE PERCENT: 0 %
BILIRUB SERPL-MCNC: 0.4 MG/DL (ref 0–1)
BILIRUBIN URINE: ABNORMAL
BLOOD, URINE: ABNORMAL
BUN BLDV-MCNC: 11 MG/DL (ref 7–20)
CALCIUM SERPL-MCNC: 9.9 MG/DL (ref 8.3–10.6)
CHLORIDE BLD-SCNC: 97 MMOL/L (ref 99–110)
CLARITY: CLEAR
CO2: 17 MMOL/L (ref 21–32)
COLOR: YELLOW
CREAT SERPL-MCNC: <0.5 MG/DL (ref 0.6–1.1)
EOSINOPHILS ABSOLUTE: 0 K/UL (ref 0–0.6)
EOSINOPHILS RELATIVE PERCENT: 0 %
EPITHELIAL CELLS, UA: ABNORMAL /HPF (ref 0–5)
GFR AFRICAN AMERICAN: >60
GFR NON-AFRICAN AMERICAN: >60
GLUCOSE BLD-MCNC: 122 MG/DL (ref 70–99)
GLUCOSE URINE: NEGATIVE MG/DL
HCT VFR BLD CALC: 36.5 % (ref 36–48)
HEMOGLOBIN: 12.5 G/DL (ref 12–16)
KETONES, URINE: >=80 MG/DL
LEUKOCYTE ESTERASE, URINE: NEGATIVE
LIPASE: 17 U/L (ref 13–60)
LYMPHOCYTES ABSOLUTE: 3.2 K/UL (ref 1–5.1)
LYMPHOCYTES RELATIVE PERCENT: 14 %
MCH RBC QN AUTO: 32.7 PG (ref 26–34)
MCHC RBC AUTO-ENTMCNC: 34.2 G/DL (ref 31–36)
MCV RBC AUTO: 95.6 FL (ref 80–100)
MICROSCOPIC EXAMINATION: YES
MONOCYTES ABSOLUTE: 0.9 K/UL (ref 0–1.3)
MONOCYTES RELATIVE PERCENT: 4 %
MUCUS: ABNORMAL /LPF
NEUTROPHILS ABSOLUTE: 19 K/UL (ref 1.7–7.7)
NEUTROPHILS RELATIVE PERCENT: 81 %
NITRITE, URINE: NEGATIVE
PDW BLD-RTO: 13.5 % (ref 12.4–15.4)
PH UA: 5.5 (ref 5–8)
PLATELET # BLD: 210 K/UL (ref 135–450)
PMV BLD AUTO: 9.8 FL (ref 5–10.5)
POTASSIUM REFLEX MAGNESIUM: 3.6 MMOL/L (ref 3.5–5.1)
PROTEIN UA: 100 MG/DL
RBC # BLD: 3.82 M/UL (ref 4–5.2)
RBC UA: ABNORMAL /HPF (ref 0–4)
SODIUM BLD-SCNC: 133 MMOL/L (ref 136–145)
SPECIFIC GRAVITY UA: >=1.03 (ref 1–1.03)
TOTAL PROTEIN: 7 G/DL (ref 6.4–8.2)
URINE TYPE: ABNORMAL
UROBILINOGEN, URINE: 0.2 E.U./DL
WBC # BLD: 23.2 K/UL (ref 4–11)
WBC UA: ABNORMAL /HPF (ref 0–5)

## 2021-11-09 PROCEDURE — 6360000002 HC RX W HCPCS: Performed by: PHYSICIAN ASSISTANT

## 2021-11-09 PROCEDURE — 96374 THER/PROPH/DIAG INJ IV PUSH: CPT

## 2021-11-09 PROCEDURE — 2500000003 HC RX 250 WO HCPCS: Performed by: PHYSICIAN ASSISTANT

## 2021-11-09 PROCEDURE — 83690 ASSAY OF LIPASE: CPT

## 2021-11-09 PROCEDURE — 2580000003 HC RX 258: Performed by: PHYSICIAN ASSISTANT

## 2021-11-09 PROCEDURE — 81001 URINALYSIS AUTO W/SCOPE: CPT

## 2021-11-09 PROCEDURE — 99285 EMERGENCY DEPT VISIT HI MDM: CPT

## 2021-11-09 PROCEDURE — 96375 TX/PRO/DX INJ NEW DRUG ADDON: CPT

## 2021-11-09 PROCEDURE — 96361 HYDRATE IV INFUSION ADD-ON: CPT

## 2021-11-09 PROCEDURE — 85025 COMPLETE CBC W/AUTO DIFF WBC: CPT

## 2021-11-09 PROCEDURE — 96372 THER/PROPH/DIAG INJ SC/IM: CPT

## 2021-11-09 PROCEDURE — 36415 COLL VENOUS BLD VENIPUNCTURE: CPT

## 2021-11-09 PROCEDURE — 80053 COMPREHEN METABOLIC PANEL: CPT

## 2021-11-09 RX ORDER — FAMOTIDINE 20 MG/1
20 TABLET, FILM COATED ORAL 2 TIMES DAILY
COMMUNITY
Start: 2021-09-22

## 2021-11-09 RX ORDER — MAGNESIUM HYDROXIDE/ALUMINUM HYDROXICE/SIMETHICONE 120; 1200; 1200 MG/30ML; MG/30ML; MG/30ML
30 SUSPENSION ORAL ONCE
Status: DISCONTINUED | OUTPATIENT
Start: 2021-11-09 | End: 2021-11-09

## 2021-11-09 RX ORDER — ONDANSETRON 2 MG/ML
4 INJECTION INTRAMUSCULAR; INTRAVENOUS ONCE
Status: COMPLETED | OUTPATIENT
Start: 2021-11-09 | End: 2021-11-09

## 2021-11-09 RX ORDER — POLYETHYLENE GLYCOL 3350 17 G
4 POWDER IN PACKET (EA) ORAL
COMMUNITY
Start: 2021-07-21

## 2021-11-09 RX ORDER — ONDANSETRON 4 MG/1
1 TABLET, FILM COATED ORAL PRN
COMMUNITY
Start: 2021-06-02

## 2021-11-09 RX ORDER — 0.9 % SODIUM CHLORIDE 0.9 %
1000 INTRAVENOUS SOLUTION INTRAVENOUS ONCE
Status: COMPLETED | OUTPATIENT
Start: 2021-11-09 | End: 2021-11-09

## 2021-11-09 RX ORDER — PROMETHAZINE HYDROCHLORIDE 25 MG/1
1 TABLET ORAL EVERY 8 HOURS PRN
COMMUNITY
Start: 2021-06-02 | End: 2021-11-10 | Stop reason: ALTCHOICE

## 2021-11-09 RX ORDER — PROMETHAZINE HYDROCHLORIDE 25 MG/ML
25 INJECTION, SOLUTION INTRAMUSCULAR; INTRAVENOUS ONCE
Status: COMPLETED | OUTPATIENT
Start: 2021-11-09 | End: 2021-11-09

## 2021-11-09 RX ADMIN — SODIUM CHLORIDE 1000 ML: 9 INJECTION, SOLUTION INTRAVENOUS at 18:54

## 2021-11-09 RX ADMIN — PROMETHAZINE HYDROCHLORIDE 25 MG: 25 INJECTION INTRAMUSCULAR; INTRAVENOUS at 20:03

## 2021-11-09 RX ADMIN — ONDANSETRON HYDROCHLORIDE 4 MG: 2 INJECTION, SOLUTION INTRAMUSCULAR; INTRAVENOUS at 18:54

## 2021-11-09 RX ADMIN — SODIUM CHLORIDE 1000 ML: 9 INJECTION, SOLUTION INTRAVENOUS at 20:00

## 2021-11-09 RX ADMIN — FAMOTIDINE 20 MG: 10 INJECTION, SOLUTION INTRAVENOUS at 20:00

## 2021-11-09 ASSESSMENT — ENCOUNTER SYMPTOMS
ABDOMINAL PAIN: 0
NAUSEA: 1
COUGH: 0
DIARRHEA: 1
SHORTNESS OF BREATH: 0
VOMITING: 1

## 2021-11-09 NOTE — ED PROVIDER NOTES
[address] [facility]        Pt Name: Lisbet Pelaez  Visit here: [id number]  [place] 1985  Date of evaluation: 11/9/2021  Provider: Rossana Anderson PA-C  PCP: RADHA Sexton CNP    Shared Visit or Autonomous Visit:  I have seen and evaluated this patient with my supervising physician Dr Abraham    Patient presents with    Emesis       HISTORY OF PRESENT ILLNESS   (Location/Symptom, Timing/Onset, Context/Setting, Quality, Duration, Modifying Factors, Severity)  Note limiting factors. Lisbet Pelaez is a 39 y.o. female presenting to the emergency department for evaluation of vomiting that started 10 AM yesterday. She has history of cyclic vomiting syndrome. States she quit smoking marijuana about 2 weeks ago. She is currently 34 weeks pregnant. This is first pregnancy. She sees  OB/GYN. No vaginal bleeding. States her sides are sore from vomiting but denies abdominal pain. Having a little diarrhea today. No fever. No urinary symptoms. No vaginal bleeding. States she has been prescribed Zofran and Phenergan at home today she tried taking her Zofran but vomited it up also tried taking her pepcid for her heartburn but vomited that up as well. States usually when this happens she comes to the hospital IV fluids and nausea medication and will feel better. The history is provided by the patient. Nausea & Vomiting  Duration:  2 days  Chronicity:  Recurrent  Associated symptoms: diarrhea    Associated symptoms: no abdominal pain, no cough, no fever and no URI    Risk factors: pregnant          Nursing Notes were reviewed    REVIEW OF SYSTEMS    (2-9 systems for level 4, 10 or more for level 5)     Review of Systems   Constitutional: Negative for fever. Respiratory: Negative for cough and shortness of breath. Cardiovascular: Negative for chest pain.    Gastrointestinal: Positive for diarrhea, nausea and vomiting. Negative for abdominal pain. Heartburn   Genitourinary: Negative for difficulty urinating and dysuria. All other systems reviewed and are negative. Positives and Pertinent negatives as per HPI.        PAST MEDICAL HISTORY     Past Medical History:   Diagnosis Date    Anemia (iron deficiency)     Depression, endogenous (HCC)     GERD (gastroesophageal reflux disease)     Heart murmur     Irritable bowel syndrome     Seizures (HCC)     Substance abuse ([facility] [address] 11/11    marijuana, benzos [facility]    Ulcer          SURGICAL HISTORY     Past Surgical History:   Procedure Laterality Date    APPENDECTOMY  4/2010    CHOLECYSTECTOMY  4/2010    ENDOSCOPY, COLON, DIAGNOSTIC  7/3/14    OTHER SURGICAL HISTORY  12/8/15    Excision scalp lesion    OVARIAN CYST REMOVAL  6/2003         CURRENTMEDICATIONS       Previous Medications    FAMOTIDINE (PEPCID) 20 MG TABLET    Take 20 mg by mouth 2 times daily    NICOTINE POLACRILEX (COMMIT) 4 MG LOZENGE    Place 4 mg inside cheek every 2 hours    ONDANSETRON (ZOFRAN) 4 MG TABLET    Take 1 tablet by mouth as needed    PRENATAL MV-MIN-FE FUM-FA-DHA (PRENATAL 1 PO)    Take by mouth    PROMETHAZINE (PHENERGAN) 25 MG TABLET    Take 1 tablet by mouth every 8 hours as needed         ALLERGIES     Citalopram hydrobromide [citalopram hydrobromide] and Phenytoin sodium extended    FAMILYHISTORY       Family History   Problem Relation Age of Onset    Cancer Other     Diabetes Other     Heart Disease Other     High Blood Pressure Other     Kidney Disease Other     Seizures Other     Stroke Other     Lupus Other           SOCIAL HISTORY       Social History     Socioeconomic History    Marital status: Legally      Spouse name: None    Number of children: None    Years of education: None    Highest education level: None   Occupational History    Occupation: Unemployed   Tobacco Use    Smoking status: Current Every Day Smoker     Packs/day: 0.50     Years: 12.00     Pack years: 6.00     Types: Cigarettes    Smokeless tobacco: Never Used   Substance and Sexual Activity    Alcohol use: No    Drug use: Yes     Types: Marijuana (Weed)     Comment: weekly- 2 to 3 times a day    Sexual activity: Yes     Partners: Male   Other Topics Concern    None   Social History Narrative    None     Social Determinants of Health     Financial Resource Strain:     Difficulty of Paying Living Expenses: Not on file   Food Insecurity:     Worried About Running Out of Food in the Last Year: Not on file    Naveed of Food in the Last Year: Not on file   Transportation Needs:     Lack of Transportation (Medical): Not on file    Lack of Transportation (Non-Medical):  Not on file   Physical Activity:     Days of Exercise per Week: Not on file    Minutes of Exercise per Session: Not on file   Stress:     Feeling of Stress : Not on file   Social Connections:     Frequency of Communication with Friends and Family: Not on file    Frequency of Social Gatherings with Friends and Family: Not on file    Attends Islam Services: Not on file    Active Member of [address] [place] [address] or Organizations: Not on file    Attends Club or Organization Meetings: Not on file    Marital Status: Not on file   Intimate Partner Violence:     Fear of Current or Ex-Partner: Not on file    Emotionally Abused: Not on file    Physically Abused: Not on file    Sexually Abused: Not on file   Housing Stability:     Unable to Pay for Housing in the Last Year: Not on file    Number of [place] in the Last Year: Not on file    Unstable Housing in the Last Year: Not on file       SCREENINGS    Sean Coma Scale  Eye Opening: Spontaneous  Best Verbal Response: Oriented  Best Motor Response: Obeys commands  Sean Coma Scale Score: 15        PHYSICAL EXAM    (up to 7 for level 4, 8 or more for level 5)     ED Triage Vitals [11/09/21 1707]   BP Temp Temp Source Pulse Resp SpO2 Height Weight   120/78 98.4 °F (36.9 °C) Oral 86 16 98 % 5' (1.524 m) 165 lb (74.8 kg)       Physical Exam  Vitals and nursing note reviewed. Constitutional:       Appearance: She is well-developed. She is not toxic-appearing. Comments: Appears she does not feel well   HENT:      Head: Normocephalic and atraumatic. Mouth/Throat:      Mouth: Mucous membranes are moist.      Pharynx: Oropharynx is clear. No pharyngeal swelling, oropharyngeal exudate or posterior oropharyngeal erythema. Eyes:      Conjunctiva/sclera: Conjunctivae normal.      Pupils: Pupils are equal, round, and reactive to light. Neck:      Vascular: No JVD. Cardiovascular:      Rate and Rhythm: Normal rate and regular rhythm. Pulmonary:      Effort: Pulmonary effort is normal. No respiratory distress. Breath sounds: Normal breath sounds. No stridor. No wheezing, rhonchi or rales. Abdominal:      General: Bowel sounds are normal. There is no distension. Palpations: Abdomen is soft. Abdomen is not rigid. There is no mass. Tenderness: There is no abdominal tenderness. There is no right CVA tenderness, left CVA tenderness, guarding or rebound. Comments: Gravid uterus   Musculoskeletal:         General: Normal range of motion. Cervical back: Normal range of motion and neck supple. Skin:     General: Skin is warm and dry. Findings: No rash. Neurological:      Mental Status: She is alert and oriented to person, place, and time. GCS: GCS eye subscore is 4. GCS verbal subscore is 5. GCS motor subscore is 6. Cranial Nerves: No cranial nerve deficit. Sensory: No sensory deficit. Motor: No abnormal muscle tone.       Coordination: Coordination normal.   Psychiatric:         Behavior: Behavior normal.         DIAGNOSTIC RESULTS   LABS:    Labs Reviewed   URINALYSIS - Abnormal; Notable for the following components:       Result Value    Bilirubin Urine SMALL (*)     Ketones, Urine >=80 (*)     Blood, Urine TRACE-LYSED (*)     Protein,  (*)     All other components within normal limits    Narrative:     Performed at:  [facility]. [facility]  [address] [place] [address]. [facility], [address] [place] [address]   Phone [phone]   MICROSCOPIC URINALYSIS - Abnormal; Notable for the following components:    Mucus, UA 1+ (*)     Bacteria, UA 2+ (*)     All other components within normal limits    Narrative:     Performed at:  [facility]. [facility]  [address] [place] [address]. [place], [address] [place] [address]   Phone [phone]   CBC WITH AUTO DIFFERENTIAL - Abnormal; Notable for the following components:    WBC 23.2 (*)     RBC 3.82 (*)     Neutrophils Absolute 19.0 (*)     All other components within normal limits    Narrative:     Performed at:  [facility]. [facility]  [address] [place] [address]. [facility], [address] [place] [address]   Phone [phone]   COMPREHENSIVE METABOLIC PANEL W/ REFLEX TO MG FOR LOW K - Abnormal; Notable for the following components:    Sodium 133 (*)     Chloride 97 (*)     CO2 17 (*)     Anion Gap 19 (*)     Glucose 122 (*)     CREATININE <0.5 (*)     Alkaline Phosphatase 156 (*)     All other components within normal limits    Narrative:     Performed at:  [facility]. [facility]  [address] [place] [address]. [facility], [address] [place] [address]   Phone [phone]   LIPASE    Narrative:     Performed at:  [facility]. [facility]  [address] [place] [address].  [facility], [address] [place] [address]   Phone [phone]     Results for orders placed or performed during the hospital encounter of 11/09/21   Urinalysis   Result Value Ref Range    Color, UA Yellow Straw/Yellow    Clarity, UA Clear Clear    Glucose, Ur Negative Negative mg/dL    Bilirubin Urine SMALL (A) Negative    Ketones, Urine >=80 (A) Negative mg/dL    Specific Gravity, UA >=1.030 1.005 - 1.030    Blood, Urine TRACE-LYSED (A) Negative    pH, UA 5.5 5.0 - 8.0 Protein,  (A) Negative mg/dL    Urobilinogen, Urine 0.2 <2.0 E.U./dL    Nitrite, Urine Negative Negative    Leukocyte Esterase, Urine Negative Negative    Microscopic Examination YES     Urine Type NotGiven    Microscopic Urinalysis   Result Value Ref Range    Mucus, UA 1+ (A) None Seen /LPF    WBC, UA 0-2 0 - 5 /HPF    RBC, UA 0-2 0 - 4 /HPF    Epithelial Cells, UA 2-5 0 - 5 /HPF    Bacteria, UA 2+ (A) None Seen /HPF   CBC Auto Differential   Result Value Ref Range    WBC 23.2 (H) 4.0 - 11.0 K/uL    RBC 3.82 (L) 4.00 - 5.20 M/uL    Hemoglobin 12.5 12.0 - 16.0 g/dL    Hematocrit 36.5 36.0 - 48.0 %    MCV 95.6 80.0 - 100.0 fL    MCH 32.7 26.0 - 34.0 pg    MCHC 34.2 31.0 - 36.0 g/dL    RDW 13.5 12.4 - 15.4 %    Platelets 883 764 - 900 K/uL    MPV 9.8 5.0 - 10.5 fL    Neutrophils % 81.0 %    Lymphocytes % 14.0 %    Monocytes % 4.0 %    Eosinophils % 0.0 %    Basophils % 0.0 %    Neutrophils Absolute 19.0 (H) 1.7 - 7.7 K/uL    Lymphocytes Absolute 3.2 1.0 - 5.1 K/uL    Monocytes Absolute 0.9 0.0 - 1.3 K/uL    Eosinophils Absolute 0.0 0.0 - 0.6 K/uL    Basophils Absolute 0.0 0.0 - 0.2 K/uL    Bands Relative 1 0 - 7 %   Comprehensive Metabolic Panel w/ Reflex to MG   Result Value Ref Range    Sodium 133 (L) 136 - 145 mmol/L    Potassium reflex Magnesium 3.6 3.5 - 5.1 mmol/L    Chloride 97 (L) 99 - 110 mmol/L    CO2 17 (L) 21 - 32 mmol/L    Anion Gap 19 (H) 3 - 16    Glucose 122 (H) 70 - 99 mg/dL    BUN 11 7 - 20 mg/dL    CREATININE <0.5 (L) 0.6 - 1.1 mg/dL    GFR Non-African American >60 >60    GFR African American >60 >60    Calcium 9.9 8.3 - 10.6 mg/dL    Total Protein 7.0 6.4 - 8.2 g/dL    Albumin 4.1 3.4 - 5.0 g/dL    Albumin/Globulin Ratio 1.4 1.1 - 2.2    Total Bilirubin 0.4 0.0 - 1.0 mg/dL    Alkaline Phosphatase 156 (H) 40 - 129 U/L    ALT 40 10 - 40 U/L    AST 34 15 - 37 U/L   Lipase   Result Value Ref Range    Lipase 17.0 13.0 - 60.0 U/L       All other labs were within normal range or not returned as of this dictation. EKG: All EKG's are interpreted by the Emergency Department Physician in the absence of a cardiologist.  Please see their note for interpretation of EKG. RADIOLOGY:   Non-plain film images such as CT, Ultrasound and MRI are read by the radiologist. Plain radiographic images are visualized andpreliminarily interpreted by the  ED Provider with the below findings:        Interpretation perthe Radiologist below, if available at the time of this note:    No orders to display     No results found. PROCEDURES   Unless otherwise noted below, none     Procedures    CRITICAL CARE TIME   Critical care provided for this patient of which 15 min were spend on critical care and decision making. 0 min spent on procedures. There was imminent failure of an organ system which required critical intervention to prevent clinically significant progression of life threatening deterioration of the patient's condition to the point of disability or death. CONSULTS:  None      EMERGENCY DEPARTMENT COURSE and DIFFERENTIAL DIAGNOSIS/MDM:   Vitals:    Vitals:    11/09/21 1707 11/09/21 1855 11/09/21 2007   BP: 120/78 110/70 112/70   Pulse: 86 70 78   Resp: 16 18 18   Temp: 98.4 °F (36.9 °C)     TempSrc: Oral     SpO2: 98% 100% 100%   Weight: 165 lb (74.8 kg)     Height: 5' (1.524 m)         Patient was given thefollowing medications:  Medications   0.9 % sodium chloride bolus (0 mLs IntraVENous Stopped 11/9/21 2007)   ondansetron (ZOFRAN) injection 4 mg (4 mg IntraVENous Given 11/9/21 1854)   famotidine (PEPCID) injection 20 mg (20 mg IntraVENous Given 11/9/21 2000)   0.9 % sodium chloride bolus (1,000 mLs IntraVENous New Bag 11/9/21 2000)   promethazine (PHENERGAN) injection 25 mg (25 mg IntraMUSCular Given 11/9/21 2003)         ED course  Patient presented to the ER for evaluation of vomiting since yesterday. Has history of cyclic vomiting syndrome this feels the same.   States has been prescribed Zofran and Phenergan for vomiting. She is currently 34 weeks pregnant. Denies any abdominal pain or any vaginal bleeding. Fetal heart tones 140s here. Took her Zofran and Pepcid at home today but vomited this up without able to keep anything down. On arrival patient actively vomiting multiple episodes here. IV was established. She was given IV fluids and Zofran with improvement we did a trial of ice chips however she vomited afterwards Phenergan was then given IM and patient slept and was given additional IV fluids 2 L normal saline for dehydration. Greater than 80 ketones on urinalysis consistent with dehydration. No UTI. White count is elevated 23.2, nonspecific, suspect this is due to her vomiting, did not suspect infection, afebrile, denies any other ill symptoms states vomiting is just like her cyclic vomiting she has had in the past.  Denies any abdominal pain. Abdomen nontender. When she was seen here in August for same white count was 22 at that time. Sodium 133. Potassium 3.6. Chloride 97. Glucose 122. CO2 17. Anion gap 19. Alk phos 156 other LFTs are normal.  Lipase is normal.  9:32 PM EST  Patient reevaluated. She is feeling much better after medications received here and IV fluids. She is drinking water and keeping it down. No further vomiting. she feels comfortable going home, this is reasonable with her keeping down oral fluids. She has an appointment with her doctor less than 48 hours. She has Zofran at home she will continue as needed. Discussed strict return precautions return to emergency department for worsening symptoms. She understands and agrees. I estimate there is LOW risk for ACUTE APPENDICITIS, BOWEL OBSTRUCTION, CHOLECYSTITIS, DIVERTICULITIS, INCARCERATED HERNIA, PANCREATITIS, PERFORATED BOWEL, BOWEL ISCHEMIA, GONADAL TORSION thus I consider the discharge disposition reasonable. Also, there is no evidence or peritonitis, sepsis, or toxicity.       FINAL IMPRESSION

## 2021-11-10 ENCOUNTER — HOSPITAL ENCOUNTER (EMERGENCY)
Age: 36
Discharge: HOME | End: 2021-11-10
Attending: STUDENT IN AN ORGANIZED HEALTH CARE EDUCATION/TRAINING PROGRAM
Payer: MEDICARE

## 2021-11-10 VITALS
RESPIRATION RATE: 16 BRPM | HEIGHT: 60 IN | HEART RATE: 60 BPM | TEMPERATURE: 98.1 F | DIASTOLIC BLOOD PRESSURE: 70 MMHG | SYSTOLIC BLOOD PRESSURE: 115 MMHG | OXYGEN SATURATION: 100 % | BODY MASS INDEX: 32.39 KG/M2 | WEIGHT: 165 LBS

## 2021-11-10 DIAGNOSIS — Z3A.34 34 WEEKS GESTATION OF PREGNANCY: ICD-10-CM

## 2021-11-10 DIAGNOSIS — R11.15 CYCLIC VOMITING SYNDROME: Primary | ICD-10-CM

## 2021-11-10 LAB
A/G RATIO: 1.4 (ref 1.1–2.2)
ALBUMIN SERPL-MCNC: 3.6 G/DL (ref 3.4–5)
ALP BLD-CCNC: 133 U/L (ref 40–129)
ALT SERPL-CCNC: 42 U/L (ref 10–40)
ANION GAP SERPL CALCULATED.3IONS-SCNC: 14 MMOL/L (ref 3–16)
AST SERPL-CCNC: 34 U/L (ref 15–37)
BASOPHILS ABSOLUTE: 0.2 K/UL (ref 0–0.2)
BASOPHILS RELATIVE PERCENT: 0.9 %
BILIRUB SERPL-MCNC: 0.3 MG/DL (ref 0–1)
BUN BLDV-MCNC: 7 MG/DL (ref 7–20)
CALCIUM SERPL-MCNC: 9.8 MG/DL (ref 8.3–10.6)
CHLORIDE BLD-SCNC: 98 MMOL/L (ref 99–110)
CO2: 19 MMOL/L (ref 21–32)
CREAT SERPL-MCNC: <0.5 MG/DL (ref 0.6–1.1)
EOSINOPHILS ABSOLUTE: 0.1 K/UL (ref 0–0.6)
EOSINOPHILS RELATIVE PERCENT: 0.6 %
GFR AFRICAN AMERICAN: >60
GFR NON-AFRICAN AMERICAN: >60
GLUCOSE BLD-MCNC: 109 MG/DL (ref 70–99)
HCT VFR BLD CALC: 31.6 % (ref 36–48)
HEMOGLOBIN: 10.8 G/DL (ref 12–16)
LYMPHOCYTES ABSOLUTE: 2.2 K/UL (ref 1–5.1)
LYMPHOCYTES RELATIVE PERCENT: 11.5 %
MAGNESIUM: 1.8 MG/DL (ref 1.8–2.4)
MCH RBC QN AUTO: 32.5 PG (ref 26–34)
MCHC RBC AUTO-ENTMCNC: 34 G/DL (ref 31–36)
MCV RBC AUTO: 95.5 FL (ref 80–100)
MONOCYTES ABSOLUTE: 0.9 K/UL (ref 0–1.3)
MONOCYTES RELATIVE PERCENT: 4.7 %
NEUTROPHILS ABSOLUTE: 15.8 K/UL (ref 1.7–7.7)
NEUTROPHILS RELATIVE PERCENT: 82.3 %
PDW BLD-RTO: 13.7 % (ref 12.4–15.4)
PLATELET # BLD: 257 K/UL (ref 135–450)
PMV BLD AUTO: 9.7 FL (ref 5–10.5)
POTASSIUM REFLEX MAGNESIUM: 3.5 MMOL/L (ref 3.5–5.1)
RBC # BLD: 3.31 M/UL (ref 4–5.2)
SODIUM BLD-SCNC: 131 MMOL/L (ref 136–145)
TOTAL PROTEIN: 6.1 G/DL (ref 6.4–8.2)
WBC # BLD: 19.1 K/UL (ref 4–11)

## 2021-11-10 PROCEDURE — 99283 EMERGENCY DEPT VISIT LOW MDM: CPT

## 2021-11-10 PROCEDURE — 36415 COLL VENOUS BLD VENIPUNCTURE: CPT

## 2021-11-10 PROCEDURE — 96375 TX/PRO/DX INJ NEW DRUG ADDON: CPT

## 2021-11-10 PROCEDURE — 96374 THER/PROPH/DIAG INJ IV PUSH: CPT

## 2021-11-10 PROCEDURE — 6360000002 HC RX W HCPCS: Performed by: STUDENT IN AN ORGANIZED HEALTH CARE EDUCATION/TRAINING PROGRAM

## 2021-11-10 PROCEDURE — 83735 ASSAY OF MAGNESIUM: CPT

## 2021-11-10 PROCEDURE — 80053 COMPREHEN METABOLIC PANEL: CPT

## 2021-11-10 PROCEDURE — 2580000003 HC RX 258: Performed by: STUDENT IN AN ORGANIZED HEALTH CARE EDUCATION/TRAINING PROGRAM

## 2021-11-10 PROCEDURE — 96361 HYDRATE IV INFUSION ADD-ON: CPT

## 2021-11-10 PROCEDURE — 2500000003 HC RX 250 WO HCPCS: Performed by: STUDENT IN AN ORGANIZED HEALTH CARE EDUCATION/TRAINING PROGRAM

## 2021-11-10 PROCEDURE — 85025 COMPLETE CBC W/AUTO DIFF WBC: CPT

## 2021-11-10 PROCEDURE — 6370000000 HC RX 637 (ALT 250 FOR IP): Performed by: STUDENT IN AN ORGANIZED HEALTH CARE EDUCATION/TRAINING PROGRAM

## 2021-11-10 RX ORDER — DEXTROSE AND SODIUM CHLORIDE 5; .9 G/100ML; G/100ML
INJECTION, SOLUTION INTRAVENOUS CONTINUOUS
Status: DISCONTINUED | OUTPATIENT
Start: 2021-11-10 | End: 2021-11-10 | Stop reason: HOSPADM

## 2021-11-10 RX ORDER — PROMETHAZINE HYDROCHLORIDE 25 MG/1
25 SUPPOSITORY RECTAL EVERY 6 HOURS PRN
Qty: 8 SUPPOSITORY | Refills: 0 | Status: SHIPPED | OUTPATIENT
Start: 2021-11-10 | End: 2021-11-12

## 2021-11-10 RX ORDER — ONDANSETRON 4 MG/1
4 TABLET, ORALLY DISINTEGRATING ORAL EVERY 8 HOURS PRN
Qty: 6 TABLET | Refills: 0 | Status: SHIPPED | OUTPATIENT
Start: 2021-11-10 | End: 2021-11-12

## 2021-11-10 RX ORDER — ONDANSETRON 2 MG/ML
4 INJECTION INTRAMUSCULAR; INTRAVENOUS ONCE
Status: COMPLETED | OUTPATIENT
Start: 2021-11-10 | End: 2021-11-10

## 2021-11-10 RX ORDER — CALCIUM CARBONATE 200(500)MG
500 TABLET,CHEWABLE ORAL ONCE
Status: COMPLETED | OUTPATIENT
Start: 2021-11-10 | End: 2021-11-10

## 2021-11-10 RX ADMIN — CALCIUM CARBONATE (ANTACID) CHEW TAB 500 MG 500 MG: 500 CHEW TAB at 16:44

## 2021-11-10 RX ADMIN — FAMOTIDINE 20 MG: 10 INJECTION, SOLUTION INTRAVENOUS at 13:58

## 2021-11-10 RX ADMIN — DEXTROSE AND SODIUM CHLORIDE: 5; 900 INJECTION, SOLUTION INTRAVENOUS at 14:01

## 2021-11-10 RX ADMIN — ONDANSETRON HYDROCHLORIDE 4 MG: 2 INJECTION, SOLUTION INTRAMUSCULAR; INTRAVENOUS at 13:57

## 2021-11-10 NOTE — ED PROVIDER NOTES
[facility]    ADELINE Attestation Note:    CHIEF COMPLAINT  Emesis       HISTORY OF PRESENT ILLNESS  This patient was seen in conjunction with the [facility]. I agree with their History, Physical Exam and Plan for this patient who repeatedly has cyclic vomiting syndrome from marijuana use which she states she stopped 2 weeks ago    Alana Montes is a 39 y.o. female  who was seen and evaluated in Room 12 presents to the ED complaining of cyclic vomiting. The work up included labs and IV fluids antiemetics      My PHYSICAL EXAM Finding included: Gravid uterus which is soft    The course of this patient's hospitalization was discussed with the [facility] directly. DISCLAIMER: This chart was created using Dragon dictation software. Efforts were made by me to ensure accuracy, however some errors may be present due to limitations of this technology and occasionally words are not transcribed correctly.           Bianca Camara MD  11/09/21 2017

## 2021-11-10 NOTE — ED NOTES
Pt discharge instructions, follow up and rx x 2 reviewed with pt. Pt verbalized understanding. No further needs. Pt discharged at this time.         Yazan Genao RN  11/10/21 2443

## 2021-11-10 NOTE — ED PROVIDER NOTES
[facility]      CHIEF COMPLAINT  Emesis During Pregnancy (Pt states that she was here yesterday and given fluids and nausea meds. States that she started throwin up again this morning at 7 am and has not stopped)     HISTORY OF PRESENT ILLNESS  Maldonado Macedo is a 39 y.o. female  who presents to the ED complaining of emesis. Patient states that she is currently 34 weeks pregnant, has been vomiting for the past few days. Was seen here yesterday and given fluids and nausea medications. States that this morning, she started throwing up again around 7 and has not been able to stop. She states that her emesis is nonbloody. She denies any abdominal pain, contractions, abnormal vaginal bleeding or vaginal discharge or leakage of fluids. She endorses good fetal movement. She states that she has Zofran and Phenergan at home but she is unable to keep them down so she did not take them today. She has an appointment with her OB/GYN tomorrow. She denies any other complaints or concerns. No other complaints, modifying factors or associated symptoms. I have reviewed the following from the nursing documentation.     Past Medical History:   Diagnosis Date    Anemia (iron deficiency)     Depression, endogenous (HCC)     GERD (gastroesophageal reflux disease)     Heart murmur     Irritable bowel syndrome     Seizures ([address]     Substance abuse ([address] 11/11    marijuana, benzos [facility]    Ulcer      Past Surgical History:   Procedure Laterality Date    APPENDECTOMY  4/2010    CHOLECYSTECTOMY  4/2010    ENDOSCOPY, COLON, DIAGNOSTIC  7/3/14    OTHER SURGICAL HISTORY  12/8/15    Excision scalp lesion    OVARIAN CYST REMOVAL  6/2003     Family History   Problem Relation Age of Onset    Cancer Other     Diabetes Other     Heart Disease Other     High Blood Pressure Other     Kidney Disease Other     Seizures Other     Stroke Other     Lupus Other      Social History     Socioeconomic History  Marital status: Legally      Spouse name: Not on file    Number of children: Not on file    Years of education: Not on file    Highest education level: Not on file   Occupational History    Occupation: Unemployed   Tobacco Use    Smoking status: Current Every Day Smoker     Packs/day: 0.50     Years: 12.00     Pack years: 6.00     Types: Cigarettes    Smokeless tobacco: Never Used   Substance and Sexual Activity    Alcohol use: No    Drug use: Not Currently     Types: Marijuana (Weed)     Comment: states she quit 2 weeks ago    Sexual activity: Yes     Partners: Male   Other Topics Concern    Not on file   Social History Narrative    Not on file     Social Determinants of Health     Financial Resource Strain:     Difficulty of Paying Living Expenses: Not on file   Food Insecurity:     Worried About Running Out of Food in the Last Year: Not on file    Naveed of Food in the Last Year: Not on file   Transportation Needs:     Lack of Transportation (Medical): Not on file    Lack of Transportation (Non-Medical):  Not on file   Physical Activity:     Days of Exercise per Week: Not on file    Minutes of Exercise per Session: Not on file   Stress:     Feeling of Stress : Not on file   Social Connections:     Frequency of Communication with Friends and Family: Not on file    Frequency of Social Gatherings with Friends and Family: Not on file    Attends Protestant Services: Not on file    Active Member of [address] [place] [address] or Organizations: Not on file    Attends Club or Organization Meetings: Not on file    Marital Status: Not on file   Intimate Partner Violence:     Fear of Current or Ex-Partner: Not on file    Emotionally Abused: Not on file    Physically Abused: Not on file    Sexually Abused: Not on file   Housing Stability:     Unable to Pay for Housing in the Last Year: Not on file    Number of [place] in the Last Year: Not on file    Unstable Housing in the Last Year: Not on file Current Facility-Administered Medications   Medication Dose Route Frequency Provider Last Rate Last Admin    dextrose 5 % and 0.9 % sodium chloride infusion   IntraVENous Continuous Jessica Jauregui MD   Stopped at 11/10/21 9367     Current Outpatient Medications   Medication Sig Dispense Refill    promethazine (PROMETHEGAN) 25 MG suppository Place 1 suppository rectally every 6 hours as needed for Nausea 8 suppository 0    ondansetron (ZOFRAN ODT) 4 MG disintegrating tablet Take 1 tablet by mouth every 8 hours as needed for Nausea or Vomiting 6 tablet 0    famotidine (PEPCID) 20 MG tablet Take 20 mg by mouth 2 times daily      nicotine polacrilex (COMMIT) 4 MG lozenge Place 4 mg inside cheek every 2 hours      ondansetron (ZOFRAN) 4 MG tablet Take 1 tablet by mouth as needed      Prenatal MV-Min-Fe Fum-FA-DHA (PRENATAL 1 PO) Take by mouth       Allergies   Allergen Reactions    Citalopram Hydrobromide [Citalopram Hydrobromide]      GI symptoms    Phenytoin Sodium Extended      itching       REVIEW OF SYSTEMS  10 systems reviewed, pertinent positives per HPI otherwise noted to be negative. PHYSICAL EXAM  /70   Pulse 60   Temp 98.1 °F (36.7 °C) (Oral)   Resp 16   Ht 5' (1.524 m)   Wt 165 lb (74.8 kg)   LMP 03/16/2021 (Exact Date)   SpO2 100%   BMI 32.22 kg/m²    GENERAL APPEARANCE: Awake and alert. Cooperative. No acute distress. HENT: Normocephalic. Atraumatic. NECK: Supple. EYES: PERRL. EOM's grossly intact. HEART/CHEST: RRR. No murmurs. LUNGS: Respirations unlabored. CTAB. Good air exchange. Speaking comfortably in full sentences. ABDOMEN: No tenderness. Gravid. Soft. No masses. No organomegaly. No guarding or rebound. MUSCULOSKELETAL: No extremity edema. Compartments soft. No deformity. No tenderness in the extremities. All extremities neurovascularly intact. SKIN: Warm and dry. No acute rashes. NEUROLOGICAL: Alert and oriented. CN's 2-12 intact.  No gross facial drooping. Strength 5/5, sensation intact. PSYCHIATRIC: Normal mood and affect. LABS  I have reviewed all labs for this visit. Results for orders placed or performed during the hospital encounter of 11/10/21   CBC Auto Differential   Result Value Ref Range    WBC 19.1 (H) 4.0 - 11.0 K/uL    RBC 3.31 (L) 4.00 - 5.20 M/uL    Hemoglobin 10.8 (L) 12.0 - 16.0 g/dL    Hematocrit 31.6 (L) 36.0 - 48.0 %    MCV 95.5 80.0 - 100.0 fL    MCH 32.5 26.0 - 34.0 pg    MCHC 34.0 31.0 - 36.0 g/dL    RDW 13.7 12.4 - 15.4 %    Platelets 895 189 - 179 K/uL    MPV 9.7 5.0 - 10.5 fL    Neutrophils % 82.3 %    Lymphocytes % 11.5 %    Monocytes % 4.7 %    Eosinophils % 0.6 %    Basophils % 0.9 %    Neutrophils Absolute 15.8 (H) 1.7 - 7.7 K/uL    Lymphocytes Absolute 2.2 1.0 - 5.1 K/uL    Monocytes Absolute 0.9 0.0 - 1.3 K/uL    Eosinophils Absolute 0.1 0.0 - 0.6 K/uL    Basophils Absolute 0.2 0.0 - 0.2 K/uL   Comprehensive Metabolic Panel w/ Reflex to MG   Result Value Ref Range    Sodium 131 (L) 136 - 145 mmol/L    Potassium reflex Magnesium 3.5 3.5 - 5.1 mmol/L    Chloride 98 (L) 99 - 110 mmol/L    CO2 19 (L) 21 - 32 mmol/L    Anion Gap 14 3 - 16    Glucose 109 (H) 70 - 99 mg/dL    BUN 7 7 - 20 mg/dL    CREATININE <0.5 (L) 0.6 - 1.1 mg/dL    GFR Non-African American >60 >60    GFR African American >60 >60    Calcium 9.8 8.3 - 10.6 mg/dL    Total Protein 6.1 (L) 6.4 - 8.2 g/dL    Albumin 3.6 3.4 - 5.0 g/dL    Albumin/Globulin Ratio 1.4 1.1 - 2.2    Total Bilirubin 0.3 0.0 - 1.0 mg/dL    Alkaline Phosphatase 133 (H) 40 - 129 U/L    ALT 42 (H) 10 - 40 U/L    AST 34 15 - 37 U/L   Magnesium   Result Value Ref Range    Magnesium 1.80 1.80 - 2.40 mg/dL       RADIOLOGY  No orders to display     ED COURSE/MDM  Patient seen and evaluated. Old records reviewed. Labs and imaging reviewed and results discussed with patient.       Patient is a 59-year-old female, 34 weeks pregnant presenting with concerns for emesis, known diagnosis of cyclic vomiting syndrome, was seen yesterday in the ED for similar complaints and received IV fluids, Zofran, and Phenergan. Charted vomiting again this morning. No OB complaints. Fetal heart tones were obtained and were in the 130s here in the department. Basic labs were obtained, did not repeat a urine as she had one done yesterday that was negative for infection or significant bacteriuria. Labs today are reassuring. Patient was treated with IV fluids, Zofran, and Pepcid in the department with improvement of her symptoms. She was also treated with Tums. Was reassessed, able to tolerate p.o. intake without difficulty. She has a follow-up appointment with her OB/GYN tomorrow. She already has Zofran and Phenergan at home but she states that she does not have the ODT Zofran so we will send her with a prescription for that as well as Phenergan suppositories as she states she was unable to keep anything down earlier today and take her medications. She is given return precautions for the ED. Patient is comfortable agreement with plan of care to be discharged. During the patient's ED course, the patient was given:  Medications   dextrose 5 % and 0.9 % sodium chloride infusion ( IntraVENous Stopped 11/10/21 1647)   ondansetron (ZOFRAN) injection 4 mg (4 mg IntraVENous Given 11/10/21 1357)   famotidine (PEPCID) injection 20 mg (20 mg IntraVENous Given 11/10/21 1358)   calcium carbonate (TUMS) chewable tablet 500 mg (500 mg Oral Given 11/10/21 1644)        CLINICAL IMPRESSION  1. Cyclic vomiting syndrome    2. 34 weeks gestation of pregnancy        Blood pressure 115/70, pulse 60, temperature 98.1 °F (36.7 °C), temperature source Oral, resp. rate 16, height 5' (1.524 m), weight 165 lb (74.8 kg), last menstrual period 03/16/2021, SpO2 100 %, not currently breastfeeding. DISPOSITION  Nicole Gilliam was discharged to home in good condition. Patient was given scripts for the following medications.  I counseled

## 2024-04-30 ENCOUNTER — HOSPITAL ENCOUNTER (EMERGENCY)
Age: 39
Discharge: HOME | End: 2024-04-30
Attending: STUDENT IN AN ORGANIZED HEALTH CARE EDUCATION/TRAINING PROGRAM
Payer: MEDICAID

## 2024-04-30 ENCOUNTER — APPOINTMENT (OUTPATIENT)
Dept: CT IMAGING | Age: 39
End: 2024-04-30
Payer: MEDICAID

## 2024-04-30 VITALS
HEIGHT: 60 IN | SYSTOLIC BLOOD PRESSURE: 109 MMHG | DIASTOLIC BLOOD PRESSURE: 52 MMHG | RESPIRATION RATE: 14 BRPM | OXYGEN SATURATION: 97 % | TEMPERATURE: 97.4 F | WEIGHT: 135 LBS | HEART RATE: 64 BPM | BODY MASS INDEX: 26.5 KG/M2

## 2024-04-30 DIAGNOSIS — R56.9 SEIZURE (HCC): Primary | ICD-10-CM

## 2024-04-30 DIAGNOSIS — E87.6 HYPOKALEMIA: ICD-10-CM

## 2024-04-30 LAB
ALBUMIN SERPL-MCNC: 4.2 G/DL (ref 3.4–5)
ALBUMIN/GLOB SERPL: 1.6 {RATIO} (ref 1.1–2.2)
ALP SERPL-CCNC: 82 U/L (ref 40–129)
ALT SERPL-CCNC: 10 U/L (ref 10–40)
ANION GAP SERPL CALCULATED.3IONS-SCNC: 17 MMOL/L (ref 3–16)
AST SERPL-CCNC: 16 U/L (ref 15–37)
BASOPHILS # BLD: 0.1 K/UL (ref 0–0.2)
BASOPHILS NFR BLD: 1.2 %
BILIRUB SERPL-MCNC: <0.2 MG/DL (ref 0–1)
BUN SERPL-MCNC: 8 MG/DL (ref 7–20)
CALCIUM SERPL-MCNC: 9 MG/DL (ref 8.3–10.6)
CHLORIDE SERPL-SCNC: 102 MMOL/L (ref 99–110)
CO2 SERPL-SCNC: 18 MMOL/L (ref 21–32)
CREAT SERPL-MCNC: <0.5 MG/DL (ref 0.6–1.1)
DEPRECATED RDW RBC AUTO: 14.5 % (ref 12.4–15.4)
EOSINOPHIL # BLD: 0.2 K/UL (ref 0–0.6)
EOSINOPHIL NFR BLD: 2.5 %
GFR SERPLBLD CREATININE-BSD FMLA CKD-EPI: >90 ML/MIN/{1.73_M2}
GLUCOSE SERPL-MCNC: 157 MG/DL (ref 70–99)
HCG SERPL QL: NEGATIVE
HCT VFR BLD AUTO: 37.9 % (ref 36–48)
HGB BLD-MCNC: 12.8 G/DL (ref 12–16)
LYMPHOCYTES # BLD: 3 K/UL (ref 1–5.1)
LYMPHOCYTES NFR BLD: 41.2 %
MAGNESIUM SERPL-MCNC: 1.9 MG/DL (ref 1.8–2.4)
MCH RBC QN AUTO: 31.8 PG (ref 26–34)
MCHC RBC AUTO-ENTMCNC: 33.8 G/DL (ref 31–36)
MCV RBC AUTO: 94 FL (ref 80–100)
MONOCYTES # BLD: 0.4 K/UL (ref 0–1.3)
MONOCYTES NFR BLD: 6 %
NEUTROPHILS # BLD: 3.6 K/UL (ref 1.7–7.7)
NEUTROPHILS NFR BLD: 49.1 %
PLATELET # BLD AUTO: 204 K/UL (ref 135–450)
PMV BLD AUTO: 9.6 FL (ref 5–10.5)
POTASSIUM SERPL-SCNC: 3.1 MMOL/L (ref 3.5–5.1)
PROT SERPL-MCNC: 6.8 G/DL (ref 6.4–8.2)
RBC # BLD AUTO: 4.03 M/UL (ref 4–5.2)
SODIUM SERPL-SCNC: 137 MMOL/L (ref 136–145)
WBC # BLD AUTO: 7.4 K/UL (ref 4–11)

## 2024-04-30 PROCEDURE — 93005 ELECTROCARDIOGRAM TRACING: CPT | Performed by: STUDENT IN AN ORGANIZED HEALTH CARE EDUCATION/TRAINING PROGRAM

## 2024-04-30 PROCEDURE — 70450 CT HEAD/BRAIN W/O DYE: CPT

## 2024-04-30 PROCEDURE — 6370000000 HC RX 637 (ALT 250 FOR IP): Performed by: STUDENT IN AN ORGANIZED HEALTH CARE EDUCATION/TRAINING PROGRAM

## 2024-04-30 PROCEDURE — 84703 CHORIONIC GONADOTROPIN ASSAY: CPT

## 2024-04-30 PROCEDURE — 99284 EMERGENCY DEPT VISIT MOD MDM: CPT

## 2024-04-30 PROCEDURE — 36415 COLL VENOUS BLD VENIPUNCTURE: CPT

## 2024-04-30 PROCEDURE — 83735 ASSAY OF MAGNESIUM: CPT

## 2024-04-30 PROCEDURE — 85025 COMPLETE CBC W/AUTO DIFF WBC: CPT

## 2024-04-30 PROCEDURE — 80053 COMPREHEN METABOLIC PANEL: CPT

## 2024-04-30 RX ORDER — LORAZEPAM 1 MG/1
2 TABLET ORAL ONCE
Status: COMPLETED | OUTPATIENT
Start: 2024-04-30 | End: 2024-04-30

## 2024-04-30 RX ORDER — POTASSIUM CHLORIDE 750 MG/1
40 TABLET, EXTENDED RELEASE ORAL ONCE
Status: COMPLETED | OUTPATIENT
Start: 2024-04-30 | End: 2024-04-30

## 2024-04-30 RX ORDER — IBUPROFEN 600 MG/1
600 TABLET ORAL ONCE
Status: COMPLETED | OUTPATIENT
Start: 2024-04-30 | End: 2024-04-30

## 2024-04-30 RX ADMIN — IBUPROFEN 600 MG: 600 TABLET, FILM COATED ORAL at 23:30

## 2024-04-30 RX ADMIN — LORAZEPAM 2 MG: 1 TABLET ORAL at 20:45

## 2024-04-30 RX ADMIN — POTASSIUM CHLORIDE 40 MEQ: 750 TABLET, EXTENDED RELEASE ORAL at 23:30

## 2024-04-30 ASSESSMENT — PAIN - FUNCTIONAL ASSESSMENT
PAIN_FUNCTIONAL_ASSESSMENT: NONE - DENIES PAIN

## 2024-04-30 ASSESSMENT — PAIN SCALES - GENERAL: PAINLEVEL_OUTOF10: 4

## 2024-05-01 LAB
EKG ATRIAL RATE: 63 BPM
EKG DIAGNOSIS: NORMAL
EKG P AXIS: 71 DEGREES
EKG P-R INTERVAL: 178 MS
EKG Q-T INTERVAL: 402 MS
EKG QRS DURATION: 108 MS
EKG QTC CALCULATION (BAZETT): 411 MS
EKG R AXIS: 61 DEGREES
EKG T AXIS: 55 DEGREES
EKG VENTRICULAR RATE: 63 BPM

## 2024-05-01 PROCEDURE — 93010 ELECTROCARDIOGRAM REPORT: CPT | Performed by: INTERNAL MEDICINE

## 2024-05-05 NOTE — ED PROVIDER NOTES
[facility] EMERGENCY DEPARTMENT  EMERGENCY DEPARTMENT ENCOUNTER        Pt Name: Michelle Riojas  MRN: [id number]  Birthdate 1985  Date of evaluation: 4/30/2024  Provider: Chin Cole MD  PCP: Jenni Moses APRN - CNP  Note Started: 8:39 PM EDT 5/4/24    CHIEF COMPLAINT       Chief Complaint   Patient presents with    Seizures     Pt had witnessed seizure in [place]. Hx of seizures but has not had one since 2018   Seizures    HISTORY OF PRESENT ILLNESS: 1 or more Elements     History from : Patient and EMS    Limitations to history : None    Michelle Riojas is a 39 y.o. female who presents status post seizure episode while she was in the grocery store.  She has significant history of seizures, has not had one since 2018.  She is no longer taking any antiepileptic medication.  Has a headache now similar to prior postictal headaches.  Endorses cannabis but no other active drug use.  No focal weakness, no fevers, no sensory loss, no neck pain.  Symptoms not otherwise alleviated or exacerbated by other factors.    Nursing Notes were all reviewed and agreed with or any disagreements were addressed in the HPI.    REVIEW OF SYSTEMS :      Positives and Pertinent negatives as per HPI.  ROS otherwise unremarkable.    SURGICAL HISTORY     Past Surgical History:   Procedure Laterality Date    APPENDECTOMY  4/2010    CHOLECYSTECTOMY  4/2010    ENDOSCOPY, COLON, DIAGNOSTIC  7/3/14    OTHER SURGICAL HISTORY  12/8/15    Excision scalp lesion    OVARIAN CYST REMOVAL  6/2003       CURRENTMEDICATIONS       Discharge Medication List as of 4/30/2024 11:21 PM          ALLERGIES     Citalopram hydrobromide [citalopram hydrobromide] and Phenytoin sodium extended    FAMILYHISTORY       Family History   Problem Relation Age of Onset    Cancer Other     Diabetes Other     Heart Disease Other     High Blood Pressure Other     Kidney Disease Other     Seizures Other     Stroke Other     Lupus Other         SOCIAL HISTORY